# Patient Record
Sex: MALE | Race: WHITE | NOT HISPANIC OR LATINO | URBAN - METROPOLITAN AREA
[De-identification: names, ages, dates, MRNs, and addresses within clinical notes are randomized per-mention and may not be internally consistent; named-entity substitution may affect disease eponyms.]

---

## 2018-11-26 ENCOUNTER — EMERGENCY (EMERGENCY)
Facility: HOSPITAL | Age: 83
LOS: 1 days | Discharge: HOME | End: 2018-11-26
Attending: EMERGENCY MEDICINE | Admitting: EMERGENCY MEDICINE
Payer: MEDICARE

## 2018-11-26 VITALS
HEART RATE: 97 BPM | SYSTOLIC BLOOD PRESSURE: 151 MMHG | DIASTOLIC BLOOD PRESSURE: 87 MMHG | OXYGEN SATURATION: 94 % | TEMPERATURE: 98 F | RESPIRATION RATE: 18 BRPM

## 2018-11-26 VITALS
TEMPERATURE: 98 F | RESPIRATION RATE: 16 BRPM | SYSTOLIC BLOOD PRESSURE: 140 MMHG | OXYGEN SATURATION: 95 % | HEART RATE: 89 BPM | DIASTOLIC BLOOD PRESSURE: 80 MMHG

## 2018-11-26 DIAGNOSIS — S01.511A LACERATION WITHOUT FOREIGN BODY OF LIP, INITIAL ENCOUNTER: ICD-10-CM

## 2018-11-26 DIAGNOSIS — S01.111A LACERATION WITHOUT FOREIGN BODY OF RIGHT EYELID AND PERIOCULAR AREA, INITIAL ENCOUNTER: ICD-10-CM
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
## 2018-11-26 DIAGNOSIS — S60.511A ABRASION OF RIGHT HAND, INITIAL ENCOUNTER: ICD-10-CM

## 2018-11-26 DIAGNOSIS — S09.93XA UNSPECIFIED INJURY OF FACE, INITIAL ENCOUNTER: ICD-10-CM
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
## 2018-11-26 DIAGNOSIS — S00.81XA ABRASION OF OTHER PART OF HEAD, INITIAL ENCOUNTER: ICD-10-CM

## 2018-11-26 PROCEDURE — 70450 CT HEAD/BRAIN W/O DYE: CPT

## 2018-11-26 PROCEDURE — 70486 CT MAXILLOFACIAL W/O DYE: CPT | Mod: 26

## 2018-11-26 PROCEDURE — 99284 EMERGENCY DEPT VISIT MOD MDM: CPT

## 2018-11-26 PROCEDURE — 70450 CT HEAD/BRAIN W/O DYE: CPT | Mod: 26

## 2018-11-26 PROCEDURE — 13151 CMPLX RPR E/N/E/L 1.1-2.5 CM: CPT

## 2018-11-26 PROCEDURE — 12011 RPR F/E/E/N/L/M 2.5 CM/<: CPT

## 2018-11-26 PROCEDURE — 99285 EMERGENCY DEPT VISIT HI MDM: CPT | Mod: 25

## 2018-11-26 PROCEDURE — 70486 CT MAXILLOFACIAL W/O DYE: CPT

## 2018-11-26 RX ORDER — BACITRACIN ZINC 500 UNIT/G
1 OINTMENT IN PACKET (EA) TOPICAL ONCE
Qty: 0 | Refills: 0 | Status: COMPLETED | OUTPATIENT
Start: 2018-11-26 | End: 2018-11-26

## 2018-11-26 RX ORDER — ACETAMINOPHEN 500 MG
975 TABLET ORAL ONCE
Qty: 0 | Refills: 0 | Status: DISCONTINUED | OUTPATIENT
Start: 2018-11-26 | End: 2018-11-30

## 2018-11-26 RX ADMIN — Medication 1 APPLICATION(S): at 20:40

## 2018-11-26 NOTE — ED PROVIDER NOTE - CARE PLAN
Principal Discharge DX:	Facial trauma, initial encounter  Secondary Diagnosis:	Lip laceration, initial encounter  Secondary Diagnosis:	Fall from standing, initial encounter

## 2018-11-26 NOTE — ED ADULT NURSE NOTE - OBJECTIVE STATEMENT
84 y/o male w/ hx of HLD, on aspirin, reports to ED s/p mechanical trip and fall PTA hitting face/nose/lip/forehead. Denies LOC. No deformity noted. Laceration noted to upper lip, below right eyebrow and abrasions noted to nose and right hand. Pt ambulatory s/p fall. Denies any other sx. Tetanus UTD.

## 2018-11-26 NOTE — ED PROVIDER NOTE - MEDICAL DECISION MAKING DETAILS
Wounds cleansed and closed by Plastics Dr. Rodriguez. No ICH, will f/u with Dr. Rodriguez outpt. Sterile dressing applied. Given return precautions.

## 2018-11-26 NOTE — CONSULT NOTE ADULT - ASSESSMENT
laceration of right upper eyelid 2cm  crush laceration mid upper lip   minimal nasal fracture  facial abrasions  abrasion right hand dorsum

## 2018-11-26 NOTE — CONSULT NOTE ADULT - ATTENDING COMMENTS
I took a history, examined the patient, reviewed his CT scan, discussed treatment options with patient and ER staff, repaired the lip and eyelid lacerations and then discussed aftercare and follow up.

## 2018-11-26 NOTE — ED PROVIDER NOTE - OBJECTIVE STATEMENT
82 y/o M w/hx HLD, takes baby ASA, walks unassisted, active fully functional individual, tripped and fell forward on the street today in the heavy rain, hitting his face/nose/lip/forehead. States he also scratched the back of his R hand but denies hand pain, wrist pain, or pain of any kind other than his face. No LOC, remembers the entire event. Ambulatory after fall. Last tetanus 2 yrs ago.

## 2018-11-26 NOTE — CONSULT NOTE ADULT - SUBJECTIVE AND OBJECTIVE BOX
Mechanical slip and fall.   CT negative for intra-cranial bleed, nondisplaced nasal fracture, old maxillary sinus thickening bilaterally without fluid levels.   Sustained abrasions of nose, 2cm laceration of right upper eyelid, crushed "W-shaped" wound of mid upper lip  dentition intact.  c- spine non-tender  abrasion right hand dorsum

## 2018-11-26 NOTE — ED ADULT TRIAGE NOTE - OTHER COMPLAINTS
Patient denies any dizziness or LOC. Laceration noted to upper lip, below right eyebrow and abrasions noted to nose and right hand. Patient reports he is on Aspirin.

## 2019-07-12 ENCOUNTER — EMERGENCY (EMERGENCY)
Facility: HOSPITAL | Age: 84
LOS: 1 days | Discharge: HOME | End: 2019-07-12
Attending: EMERGENCY MEDICINE | Admitting: EMERGENCY MEDICINE
Payer: MEDICARE

## 2019-07-12 VITALS
RESPIRATION RATE: 18 BRPM | TEMPERATURE: 98 F | OXYGEN SATURATION: 100 % | DIASTOLIC BLOOD PRESSURE: 67 MMHG | SYSTOLIC BLOOD PRESSURE: 107 MMHG | HEART RATE: 66 BPM

## 2019-07-12 VITALS
HEART RATE: 78 BPM | SYSTOLIC BLOOD PRESSURE: 100 MMHG | RESPIRATION RATE: 18 BRPM | DIASTOLIC BLOOD PRESSURE: 55 MMHG | TEMPERATURE: 98 F | WEIGHT: 190.04 LBS | OXYGEN SATURATION: 100 %

## 2019-07-12 DIAGNOSIS — R07.89 OTHER CHEST PAIN: ICD-10-CM

## 2019-07-12 LAB
ALBUMIN SERPL ELPH-MCNC: 3.9 G/DL — SIGNIFICANT CHANGE UP (ref 3.3–5)
ALP SERPL-CCNC: 47 U/L — SIGNIFICANT CHANGE UP (ref 40–120)
ALT FLD-CCNC: 15 U/L — SIGNIFICANT CHANGE UP (ref 10–45)
ANION GAP SERPL CALC-SCNC: 10 MMOL/L — SIGNIFICANT CHANGE UP (ref 5–17)
APTT BLD: 28.2 SEC — SIGNIFICANT CHANGE UP (ref 27.5–36.3)
AST SERPL-CCNC: 17 U/L — SIGNIFICANT CHANGE UP (ref 10–40)
BASOPHILS # BLD AUTO: 0.05 K/UL — SIGNIFICANT CHANGE UP (ref 0–0.2)
BASOPHILS NFR BLD AUTO: 0.5 % — SIGNIFICANT CHANGE UP (ref 0–2)
BILIRUB SERPL-MCNC: 0.8 MG/DL — SIGNIFICANT CHANGE UP (ref 0.2–1.2)
BUN SERPL-MCNC: 16 MG/DL — SIGNIFICANT CHANGE UP (ref 7–23)
CALCIUM SERPL-MCNC: 8.8 MG/DL — SIGNIFICANT CHANGE UP (ref 8.4–10.5)
CHLORIDE SERPL-SCNC: 104 MMOL/L — SIGNIFICANT CHANGE UP (ref 96–108)
CO2 SERPL-SCNC: 25 MMOL/L — SIGNIFICANT CHANGE UP (ref 22–31)
CREAT SERPL-MCNC: 1.1 MG/DL — SIGNIFICANT CHANGE UP (ref 0.5–1.3)
EOSINOPHIL # BLD AUTO: 0.2 K/UL — SIGNIFICANT CHANGE UP (ref 0–0.5)
EOSINOPHIL NFR BLD AUTO: 2.1 % — SIGNIFICANT CHANGE UP (ref 0–6)
GLUCOSE SERPL-MCNC: 111 MG/DL — HIGH (ref 70–99)
HCT VFR BLD CALC: 44.9 % — SIGNIFICANT CHANGE UP (ref 39–50)
HGB BLD-MCNC: 14.6 G/DL — SIGNIFICANT CHANGE UP (ref 13–17)
IMM GRANULOCYTES NFR BLD AUTO: 0.3 % — SIGNIFICANT CHANGE UP (ref 0–1.5)
INR BLD: 1.05 — SIGNIFICANT CHANGE UP (ref 0.88–1.16)
LYMPHOCYTES # BLD AUTO: 1.15 K/UL — SIGNIFICANT CHANGE UP (ref 1–3.3)
LYMPHOCYTES # BLD AUTO: 12.2 % — LOW (ref 13–44)
MCHC RBC-ENTMCNC: 31.8 PG — SIGNIFICANT CHANGE UP (ref 27–34)
MCHC RBC-ENTMCNC: 32.5 GM/DL — SIGNIFICANT CHANGE UP (ref 32–36)
MCV RBC AUTO: 97.8 FL — SIGNIFICANT CHANGE UP (ref 80–100)
MONOCYTES # BLD AUTO: 0.75 K/UL — SIGNIFICANT CHANGE UP (ref 0–0.9)
MONOCYTES NFR BLD AUTO: 8 % — SIGNIFICANT CHANGE UP (ref 2–14)
NEUTROPHILS # BLD AUTO: 7.22 K/UL — SIGNIFICANT CHANGE UP (ref 1.8–7.4)
NEUTROPHILS NFR BLD AUTO: 76.9 % — SIGNIFICANT CHANGE UP (ref 43–77)
NRBC # BLD: 0 /100 WBCS — SIGNIFICANT CHANGE UP (ref 0–0)
PLATELET # BLD AUTO: 211 K/UL — SIGNIFICANT CHANGE UP (ref 150–400)
POTASSIUM SERPL-MCNC: 4.1 MMOL/L — SIGNIFICANT CHANGE UP (ref 3.5–5.3)
POTASSIUM SERPL-SCNC: 4.1 MMOL/L — SIGNIFICANT CHANGE UP (ref 3.5–5.3)
PROT SERPL-MCNC: 6.8 G/DL — SIGNIFICANT CHANGE UP (ref 6–8.3)
PROTHROM AB SERPL-ACNC: 11.9 SEC — SIGNIFICANT CHANGE UP (ref 10–12.9)
RBC # BLD: 4.59 M/UL — SIGNIFICANT CHANGE UP (ref 4.2–5.8)
RBC # FLD: 12.1 % — SIGNIFICANT CHANGE UP (ref 10.3–14.5)
SODIUM SERPL-SCNC: 139 MMOL/L — SIGNIFICANT CHANGE UP (ref 135–145)
TROPONIN T SERPL-MCNC: <0.01 NG/ML — SIGNIFICANT CHANGE UP (ref 0–0.01)
WBC # BLD: 9.4 K/UL — SIGNIFICANT CHANGE UP (ref 3.8–10.5)
WBC # FLD AUTO: 9.4 K/UL — SIGNIFICANT CHANGE UP (ref 3.8–10.5)

## 2019-07-12 PROCEDURE — 80053 COMPREHEN METABOLIC PANEL: CPT

## 2019-07-12 PROCEDURE — 99285 EMERGENCY DEPT VISIT HI MDM: CPT | Mod: 25

## 2019-07-12 PROCEDURE — 99283 EMERGENCY DEPT VISIT LOW MDM: CPT | Mod: 25

## 2019-07-12 PROCEDURE — 84484 ASSAY OF TROPONIN QUANT: CPT

## 2019-07-12 PROCEDURE — 71045 X-RAY EXAM CHEST 1 VIEW: CPT

## 2019-07-12 PROCEDURE — 85025 COMPLETE CBC W/AUTO DIFF WBC: CPT

## 2019-07-12 PROCEDURE — 71045 X-RAY EXAM CHEST 1 VIEW: CPT | Mod: 26

## 2019-07-12 PROCEDURE — 85610 PROTHROMBIN TIME: CPT

## 2019-07-12 PROCEDURE — 85730 THROMBOPLASTIN TIME PARTIAL: CPT

## 2019-07-12 NOTE — ED ADULT NURSE NOTE - OBJECTIVE STATEMENT
Pt presents complaining of chest tightness that started at 8:30 am. Pt denies any fevers, no lightheadedness, no dizziness, no headache, no cp, no sob, no n/v, no changes to bowels, no urinary com[plaints.

## 2019-07-12 NOTE — ED PROVIDER NOTE - ATTENDING CONTRIBUTION TO CARE
Pt is an 85yo m, no pmh, who p/w mid sternal cp starting 4a today. No associated sob, palp, dizziness, weakness, syncope... Sx's non-exertional, non-radiating.     VITAL SIGNS: I have reviewed nursing notes and confirm.  CONSTITUTIONAL: Well-developed; well-nourished; in no acute distress.   SKIN:  warm and dry, no acute rash.   HEAD:  normocephalic, atraumatic.  EYES: PERRL, EOM intact; conjunctiva and sclera clear.  ENT: No nasal discharge; airway clear.   NECK: Supple; non tender.  CARD: S1, S2 normal; no murmurs, gallops, or rubs. Regular rate and rhythm.   RESP:  Clear to auscultation b/l, no wheezes, rales or rhonchi.  ABD: Normal bowel sounds; soft; non-distended; non-tender; no guarding/ rebound.  EXT: Normal ROM. No clubbing, cyanosis or edema. 2+ pulses to b/l ue/le.  NEURO: Alert, oriented, grossly unremarkable  PSYCH: Cooperative, mood and affect appropriate.    EKG showing nsr, rbbb, q's inferior leads. No prior to compare to. Trop neg. CXR neg. Spoke w/ pt regarding need for further cardiac testing including cardiac cta/ trop, however pt refusing and seeking dc home. Patient desires to leave emergency department against medical advice, prior to completion of my desired evaluation and treatment plan.  Patient's mental status is normal, patient is fully alert and oriented x person, place and time.  Patient demonstrates clear reasoning capabilities and capacity to make this decision.  Patient fully understands the explained risks involved with this decision including worsening of medical condition, missed and or delayed diagnosis, permanent disability and death.  All alternative options given to patient who still desires discharge against medical advice from ED and will follow up as an outpatient.  Patient given the option to return to ED at any time to have further evaluation and treatment. Pt is an 85yo m, no pmh, who p/w mid sternal cp starting 4a today. No associated sob, palp, dizziness, sweating, weakness, syncope... Sx's non-exertional, non-radiating. Currently asymptomatic. Had normal stress test and echo few months ago.    VITAL SIGNS: I have reviewed nursing notes and confirm.  CONSTITUTIONAL: Well-developed; well-nourished; in no acute distress.   SKIN:  warm and dry, no acute rash.   HEAD:  normocephalic, atraumatic.  EYES: EOM intact; conjunctiva and sclera clear.  ENT: No nasal discharge; airway clear.   NECK: Supple; non tender.  CARD: S1, S2 normal; no murmurs, gallops, or rubs. Regular rate and rhythm.   RESP:  Clear to auscultation b/l, no wheezes, rales or rhonchi.  ABD: Normal bowel sounds; soft; non-distended; non-tender; no guarding/ rebound.  EXT: Normal ROM. No clubbing, cyanosis or edema. 2+ pulses to b/l ue/le.  NEURO: Alert, oriented, grossly unremarkable  PSYCH: Cooperative, mood and affect appropriate.    EKG showing nsr, rbbb, q's inferior leads. Pt stating he has h/o rbbb which prompted stress test few months ago. No prior ekg to compare to. Trop neg. CXR neg. Spoke w/ pt regarding need for further cardiac testing including cardiac cta/ trop, however pt refusing and seeking dc home. Patient desires to leave emergency department against medical advice, prior to completion of my desired evaluation and treatment plan.  Patient's mental status is normal, patient is fully alert and oriented x person, place and time.  Patient demonstrates clear reasoning capabilities and capacity to make this decision.  Patient fully understands the explained risks involved with this decision including worsening of medical condition, missed and or delayed diagnosis, permanent disability and death.  All alternative options given to patient who still desires discharge against medical advice from ED and will follow up as an outpatient.  Patient given the option to return to ED at any time to have further evaluation and treatment.

## 2019-07-12 NOTE — ED PROVIDER NOTE - REFUSAL OF SERVICE, MDM
pt refused cta coronaries - stating he does not want to wait, also refused 2nd trop (offered as alternative option for adequate r/o) - wants to leave AMA, called his card from ed and will see him upon dc, pt understands that can not definitively r/o acs w/single trop, understands risks of leaving prior to complete w/u including worsening symptoms or death

## 2019-07-12 NOTE — ED ADULT NURSE NOTE - CHPI ED NUR SYMPTOMS NEG
no back pain/no shortness of breath/no chills/no chest pain/no congestion/no vomiting/no nausea/no syncope/no dizziness/no diaphoresis/no fever

## 2019-07-12 NOTE — ED PROVIDER NOTE - CLINICAL SUMMARY MEDICAL DECISION MAKING FREE TEXT BOX
pt c/o midsternal chest "tightness" since this am, not exertional, no associated symptoms, states no known cad and has seen cards a few mon ago for routine eval at which time had neg stress test and normal echo, ekg w/rbbb, cxr clear, trop x 1 neg, had long discussion w/pt about getting cta coronaries to r/o acs but declined, offered 2nd trop for sufficient r/o but also refused, pt spoke to his cards (non [facility]) and will see him upon dc from ed, understands that is signing out ama since w/u incomplete, pt w/full decision making capacity and risks of death understood

## 2019-07-12 NOTE — ED PROVIDER NOTE - OBJECTIVE STATEMENT
The pt is a 85 y/o M, BIBA, c/o "chest tightness" since this am. Pt states "I woke up with it", pain is 4/10, constant, dull, midsternal, non radiating, no aggravating or alleviating factors, took asa w/o change in pain. Had seen a cardiologist for routine eval a few mon ago - had normal stress test and echo. Denies sob, dyspnea, cough, n/v/d, abd pain, dizziness, diaphoresis, syncope, fevers, chills

## 2019-07-12 NOTE — ED PROVIDER NOTE - MUSCULOSKELETAL, MLM
Spine appears normal, range of motion is not limited, no muscle or joint tenderness; no LE edema or tend b/l

## 2020-11-11 ENCOUNTER — EMERGENCY (EMERGENCY)
Facility: HOSPITAL | Age: 85
LOS: 1 days | Discharge: HOME | End: 2020-11-11
Attending: EMERGENCY MEDICINE | Admitting: EMERGENCY MEDICINE
Payer: MEDICARE

## 2020-11-11 VITALS
RESPIRATION RATE: 16 BRPM | HEART RATE: 90 BPM | SYSTOLIC BLOOD PRESSURE: 150 MMHG | OXYGEN SATURATION: 95 % | DIASTOLIC BLOOD PRESSURE: 80 MMHG | TEMPERATURE: 98 F

## 2020-11-11 VITALS
OXYGEN SATURATION: 94 % | WEIGHT: 167.99 LBS | TEMPERATURE: 98 F | HEIGHT: 68 IN | HEART RATE: 98 BPM | DIASTOLIC BLOOD PRESSURE: 74 MMHG | RESPIRATION RATE: 18 BRPM | SYSTOLIC BLOOD PRESSURE: 122 MMHG
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
## 2020-11-11 DIAGNOSIS — S01.111A LACERATION WITHOUT FOREIGN BODY OF RIGHT EYELID AND PERIOCULAR AREA, INITIAL ENCOUNTER: ICD-10-CM

## 2020-11-11 DIAGNOSIS — Z20.828 CONTACT WITH AND (SUSPECTED) EXPOSURE TO OTHER VIRAL COMMUNICABLE DISEASES: ICD-10-CM

## 2020-11-11 DIAGNOSIS — S09.90XA UNSPECIFIED INJURY OF HEAD, INITIAL ENCOUNTER: ICD-10-CM

## 2020-11-11 DIAGNOSIS — S01.81XA LACERATION WITHOUT FOREIGN BODY OF OTHER PART OF HEAD, INITIAL ENCOUNTER: ICD-10-CM

## 2020-11-11 DIAGNOSIS — S02.2XXA FRACTURE OF NASAL BONES, INITIAL ENCOUNTER FOR CLOSED FRACTURE: ICD-10-CM
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
## 2020-11-11 DIAGNOSIS — S01.511A LACERATION WITHOUT FOREIGN BODY OF LIP, INITIAL ENCOUNTER: ICD-10-CM

## 2020-11-11 LAB
ANION GAP SERPL CALC-SCNC: 10 MMOL/L — SIGNIFICANT CHANGE UP (ref 5–17)
BASOPHILS # BLD AUTO: 0.07 K/UL — SIGNIFICANT CHANGE UP (ref 0–0.2)
BASOPHILS NFR BLD AUTO: 0.7 % — SIGNIFICANT CHANGE UP (ref 0–2)
BUN SERPL-MCNC: 24 MG/DL — HIGH (ref 7–23)
CALCIUM SERPL-MCNC: 9.3 MG/DL — SIGNIFICANT CHANGE UP (ref 8.4–10.5)
CHLORIDE SERPL-SCNC: 110 MMOL/L — HIGH (ref 96–108)
CO2 SERPL-SCNC: 27 MMOL/L — SIGNIFICANT CHANGE UP (ref 22–31)
CREAT SERPL-MCNC: 1.31 MG/DL — HIGH (ref 0.5–1.3)
EOSINOPHIL # BLD AUTO: 0.13 K/UL — SIGNIFICANT CHANGE UP (ref 0–0.5)
EOSINOPHIL NFR BLD AUTO: 1.3 % — SIGNIFICANT CHANGE UP (ref 0–6)
GLUCOSE SERPL-MCNC: 112 MG/DL — HIGH (ref 70–99)
HCT VFR BLD CALC: 43.9 % — SIGNIFICANT CHANGE UP (ref 39–50)
HGB BLD-MCNC: 14.6 G/DL — SIGNIFICANT CHANGE UP (ref 13–17)
IMM GRANULOCYTES NFR BLD AUTO: 0.3 % — SIGNIFICANT CHANGE UP (ref 0–1.5)
LYMPHOCYTES # BLD AUTO: 1.34 K/UL — SIGNIFICANT CHANGE UP (ref 1–3.3)
LYMPHOCYTES # BLD AUTO: 13 % — SIGNIFICANT CHANGE UP (ref 13–44)
MAGNESIUM SERPL-MCNC: 2.1 MG/DL — SIGNIFICANT CHANGE UP (ref 1.6–2.6)
MCHC RBC-ENTMCNC: 32.4 PG — SIGNIFICANT CHANGE UP (ref 27–34)
MCHC RBC-ENTMCNC: 33.3 GM/DL — SIGNIFICANT CHANGE UP (ref 32–36)
MCV RBC AUTO: 97.3 FL — SIGNIFICANT CHANGE UP (ref 80–100)
MONOCYTES # BLD AUTO: 0.66 K/UL — SIGNIFICANT CHANGE UP (ref 0–0.9)
MONOCYTES NFR BLD AUTO: 6.4 % — SIGNIFICANT CHANGE UP (ref 2–14)
NEUTROPHILS # BLD AUTO: 8.09 K/UL — HIGH (ref 1.8–7.4)
NEUTROPHILS NFR BLD AUTO: 78.3 % — HIGH (ref 43–77)
NRBC # BLD: 0 /100 WBCS — SIGNIFICANT CHANGE UP (ref 0–0)
PLATELET # BLD AUTO: 178 K/UL — SIGNIFICANT CHANGE UP (ref 150–400)
POTASSIUM SERPL-MCNC: 4.2 MMOL/L — SIGNIFICANT CHANGE UP (ref 3.5–5.3)
POTASSIUM SERPL-SCNC: 4.2 MMOL/L — SIGNIFICANT CHANGE UP (ref 3.5–5.3)
RBC # BLD: 4.51 M/UL — SIGNIFICANT CHANGE UP (ref 4.2–5.8)
RBC # FLD: 12.6 % — SIGNIFICANT CHANGE UP (ref 10.3–14.5)
SARS-COV-2 RNA SPEC QL NAA+PROBE: SIGNIFICANT CHANGE UP
SODIUM SERPL-SCNC: 147 MMOL/L — HIGH (ref 135–145)
TROPONIN T SERPL-MCNC: <0.01 NG/ML — SIGNIFICANT CHANGE UP (ref 0–0.01)
WBC # BLD: 10.32 K/UL — SIGNIFICANT CHANGE UP (ref 3.8–10.5)
WBC # FLD AUTO: 10.32 K/UL — SIGNIFICANT CHANGE UP (ref 3.8–10.5)

## 2020-11-11 PROCEDURE — 70486 CT MAXILLOFACIAL W/O DYE: CPT | Mod: 26

## 2020-11-11 PROCEDURE — 36415 COLL VENOUS BLD VENIPUNCTURE: CPT

## 2020-11-11 PROCEDURE — 73130 X-RAY EXAM OF HAND: CPT | Mod: 26,RT

## 2020-11-11 PROCEDURE — 99285 EMERGENCY DEPT VISIT HI MDM: CPT | Mod: 25

## 2020-11-11 PROCEDURE — 84484 ASSAY OF TROPONIN QUANT: CPT

## 2020-11-11 PROCEDURE — 13152 CMPLX RPR E/N/E/L 2.6-7.5 CM: CPT | Mod: XU

## 2020-11-11 PROCEDURE — U0003: CPT

## 2020-11-11 PROCEDURE — 85025 COMPLETE CBC W/AUTO DIFF WBC: CPT

## 2020-11-11 PROCEDURE — 70486 CT MAXILLOFACIAL W/O DYE: CPT

## 2020-11-11 PROCEDURE — 99285 EMERGENCY DEPT VISIT HI MDM: CPT | Mod: CS

## 2020-11-11 PROCEDURE — 70450 CT HEAD/BRAIN W/O DYE: CPT

## 2020-11-11 PROCEDURE — 80048 BASIC METABOLIC PNL TOTAL CA: CPT

## 2020-11-11 PROCEDURE — 82962 GLUCOSE BLOOD TEST: CPT

## 2020-11-11 PROCEDURE — 83735 ASSAY OF MAGNESIUM: CPT

## 2020-11-11 PROCEDURE — 40650 RPR LIP FTH VERMILION ONLY: CPT

## 2020-11-11 PROCEDURE — 70450 CT HEAD/BRAIN W/O DYE: CPT | Mod: 26

## 2020-11-11 PROCEDURE — 73130 X-RAY EXAM OF HAND: CPT

## 2020-11-11 RX ORDER — SODIUM CHLORIDE 9 MG/ML
1000 INJECTION INTRAMUSCULAR; INTRAVENOUS; SUBCUTANEOUS
Refills: 0 | Status: DISCONTINUED | OUTPATIENT
Start: 2020-11-11 | End: 2020-11-15

## 2020-11-11 RX ADMIN — SODIUM CHLORIDE 100 MILLILITER(S): 9 INJECTION INTRAMUSCULAR; INTRAVENOUS; SUBCUTANEOUS at 19:03

## 2020-11-11 NOTE — ED PROVIDER NOTE - PATIENT PORTAL LINK FT
You can access the FollowMyHealth Patient Portal offered by [facility] by registering at the following website: [email or web address]. By joining [employer]’s FollowMyHealth portal, you will also be able to view your health information using other applications (apps) compatible with our system.

## 2020-11-11 NOTE — ED PROVIDER NOTE - CLINICAL SUMMARY MEDICAL DECISION MAKING FREE TEXT BOX
Pt presents s/p fall s/p feeling b/l LE weakness w/o other complaints. No syncope or LOC. There are no EKG changes to suggest ischemia, infarction, or pericarditis. CT / XR to r/o injury. TDaP UTD. Laceration through vermillion border, gaping. Pt also requesting plastics. Dispo pending w/u, clinical status. Anticipate d/c if w/u negative

## 2020-11-11 NOTE — CONSULT NOTE ADULT - SUBJECTIVE AND OBJECTIVE BOX
85 year old male s/p trip and fall with no loss of consciousness, no aspirin or anticoagulation.  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  After patient stable, I was called to repair traumatic injury to right eyebrow and upper lip.  Patient had a fall sustained several years ago with through and through injury of the lip that was repaired, and physical exam is consistent with this as there is a well healed jagged scar just lateral to the current injury and extensive scar tissue within the substance of the lip just lateral to the current injury with some sclerotic changes of the orbicularis oris muscle within the injury site.    On exam there is a 4 cm laceration through the complete length of the right eyebrow.  This injury is through skin and muscle.  The lip injury as noted is through the mucosa of the wet and dry lip and the orbicularis muscle is disrupted.  The injury is through the vermilion border and into the right philtral column.

## 2020-11-11 NOTE — ED PROVIDER NOTE - PROGRESS NOTE DETAILS
Plastics consulted for lac repair: lac through vermillion border, through eyebrow, pt also requesting plastics on call Dr Fuentes in the ED evaluating the pt Wound repaired by plastics. Pt to f/u Dr Fuentes within 1 week. Pt has info from Dr Fuentes. Pt advised on nasal bone fx, and f/u w/ ortho for this. Pt advised to hydrate more. Pt reports nasal bone fx last year. Pt given opportunity to ask questions and demonstrates understanding of plan.

## 2020-11-11 NOTE — ED ADULT NURSE NOTE - CHPI ED NUR SYMPTOMS NEG
no loss of consciousness/no tingling/no confusion/no vomiting/no deformity/no fever/no weakness/no numbness

## 2020-11-11 NOTE — ED PROVIDER NOTE - DIAGNOSTIC INTERPRETATION
R hand INTERPRETATION:  no acute fracture; no soft tissue swelling noted; normal bony alignment. degenerative changes at the DIPs R 3th and 5th fingers

## 2020-11-11 NOTE — ED PROVIDER NOTE - OBJECTIVE STATEMENT
Pt w/ PMHx HLD presents s/p fall. Pt was walking, felt weak in b/l legs, and fell forward. Pt reports he fell onto R hand (pt is LHD), breaking the fall, and hitting his face. Pt also broke his glasses. Pt denies LOC. Denies preceding CP, SOB, palpitations, diaphoresis, lightheadedness, vertiginous sx, and stroke-like sx. TDaP within 10 years. Denies neck/ back / joint pain. No chest / abd injuries. Pt c/o lacerations to face, pain to the face, and pain to the R hand

## 2020-11-11 NOTE — ED ADULT TRIAGE NOTE - CHIEF COMPLAINT QUOTE
Pt presents to ED c/o laceration to lip and above right eyebrow s/p mechanical trip and fall. Denies LOC or blood thinners. Denies neck pain, back pain, and any other sx. Pt was able to get up after fall w/ help.

## 2020-11-11 NOTE — ED PROVIDER NOTE - CARE PROVIDER_API CALL
James Fuentes)  Plastic Surgery  [address], [address]  [place] [address]  Phone: [phone]  Fax: [phone]  Follow Up Time:

## 2020-11-11 NOTE — ED PROVIDER NOTE - NSFOLLOWUPINSTRUCTIONS_ED_ALL_ED_FT
Fall Prevention for Older Adults    WHAT YOU NEED TO KNOW:    As you age, your muscles weaken and your risk for falls increases. Your risk also increases if you take medicines that make you sleepy or dizzy. You may also be at risk if you have vision or joint problems, have low blood pressure, or are not active.    DISCHARGE INSTRUCTIONS:    Call 911 or have someone else call if:   •You have fallen and are unconscious.      •You have fallen and cannot move part of your body.      Contact your healthcare provider if:   •You have fallen and have pain or a headache.      •You have questions or concerns about your condition or care.      Fall prevention tips:   •Stay active. Exercise can help strengthen your muscles and improve your balance. Your healthcare provider may recommend water aerobics, walking, or Alfred[employer] Chi. He or she may also recommend physical therapy to improve your coordination. Never start an exercise program without asking your healthcare provider first.  Water Aerobics for Seniors       [employer] for Seniors           •Wear shoes that fit well and have soles that . Wear shoes both inside and outside. Use slippers with good . Avoid shoes with high heels.      •Use assistive devices as directed. Your healthcare provider may suggest that you use a cane or walker to help you keep your balance. You may need to have grab bars put in your bathroom near the toilet or in the shower.      •Stand or sit up slowly. This may help you keep your balance and prevent falls.      •Wear a personal alarm. This is a device that allows you to call 911 if you need help. Ask for more information on personal alarms.      •Manage your medical conditions.  Keep all appointments with your healthcare providers. Visit your eye doctor as directed.      Home safety tips:     Fall Prevention for Seniors     •Add items to prevent falls in the bathroom. Put nonslip strips on your bath or shower floor to prevent you from slipping. Use a bath mat if you do not have carpet in the bathroom. This will prevent you from falling when you step out of the bath or shower. Use a shower seat so you do not need to stand while you shower. Sit on the toilet or a chair in your bathroom to dry yourself and put on clothing. This will prevent you from losing your balance from drying or dressing yourself while you are standing.      •Keep paths clear. Remove books, shoes, and other objects from walkways and stairs. Place cords for telephones and lamps out of the way so that you do not need to walk over them. Tape them down if you cannot move them. Remove small rugs. If you cannot remove a rug, secure it with double-sided tape. This will prevent you from tripping.      •Install bright lights in your home. Use night lights to help light paths to the bathroom or kitchen. Always turn on the light before you start walking.      •Keep items you use often on shelves within reach. Do not use a step stool to help you reach an item.      •Paint or place reflective tape on the edges of your stairs. This will help you see the stairs better.      Follow up with your healthcare provider as directed: Write down your questions so you remember to ask them during your visits.         Facial Laceration      A facial laceration is a cut (laceration) on the face. You can get a facial laceration from any accident or injury that cuts or tears the skin or tissues on your face. Facial lacerations can bleed and be painful. You may need medical attention to stop the bleeding, help the wound heal, lower your risk for infection, and prevent scarring. Lacerations usually heal quickly after treatment.      What are the causes?  Facial lacerations are often caused by:  •A motor vehicle accident.      •A sports injury.      •A violent attack.      •A fall.        What are the signs or symptoms?  Common symptoms of this condition include:  •An obvious cut on the face.      •Bleeding.      •Pain.      •Swelling.      •Bruising.      •A change in the appearance of the face (deformity).        How is this diagnosed?    Your health care provider can diagnose a facial laceration by doing a physical exam and asking how the injury happened. Your provider will also check for areas of bleeding, tissue damage, nerve injury, and a foreign body in your wound.      How is this treated?  Treatment for a facial laceration depends on how severe and deep the wound is. It also depends on the risk for infection. First, your health care provider will clean the wound to prevent infection. Then, your health care provider will decide whether to close the wound. This depends on how deep the laceration is and how long ago your injury happened. If there is an increased risk of infection, the wound will not be closed.•If your wound needs to be closed:  •Your health care provider will use stitches (sutures), skin glue (skin adhesive), or skin adhesive strips to repair the laceration.      •Your health care provider may first numb the area around your wound by injecting a numbing medicine (local anesthetic) in and around your laceration before doing the sutures.      •Torn skin edges or dead skin may be removed.      •If sutures are used, the laceration may be closed in layers. Absorbable sutures will be used for deep tissues and muscle. Removable sutures will be used to close the skin.      •You may be given:  •Pain medicine.      •A tetanus shot.      •Oral antibiotic medicines.      •Antibiotic ointment.          Follow these instructions at home:    Wound care     Follow your health care provider’s instructions for wound care. These instructions will vary depending on how the wound was closed.  For sutures:   •Keep the wound clean and dry.      •If you were given a bandage (dressing), change it at least once a day, or as told by your health care provider. Also change the dressing if it gets wet or dirty.      •Wash the wound with soap and water two times a day, or as told by your health care provider. Rinse off the soap with water. Pat the wound dry with a clean towel.      •After cleaning, apply a thin layer of antibiotic ointment as told by your health care provider. This helps prevent infection and keeps the dressing from sticking to the wound.      •You may shower as usual after the first 24 hours. Do not soak the wound until the sutures are removed.      •Return to have you sutures removed as told by your health care provider.      • Do not wear makeup until your health care provider has approved.    For skin adhesive:    •You may briefly wet your wound in the shower or bath.      • Do not soak or scrub the wound.      • Do not swim.      • Do not sweat heavily until the skin adhesive has fallen off on its own.      •After showering or bathing, gently pat the wound dry with a clean towel.      • Do not apply liquid medicine, cream medicine, ointment, or makeup to your wound while the skin adhesive is in place. This may loosen the film before your wound is healed.      •If you have a dressing over your wound, be careful not to apply tape directly over the skin adhesive. This may pull off the adhesive before the wound is healed.      • Do not spend a long time in the sun or use a tanning lamp while the skin adhesive is in place.      •The skin adhesive will usually remain in place for 5–10 days and then naturally fall off the skin. Do not pick at the adhesive film.    For skin adhesive strips:    •Keep the wound clean and dry.      • Do not let the skin adhesive strips get wet.      •Bathe carefully to keep the wound and adhesive strips dry. If the wound gets wet, pat it dry with a clean towel right away.      •Skin adhesive strips fall off on their own over time. You may trim the strips as the wound heals. Do not remove skin adhesive strips that are still stuck to the wound.        General instructions    •Check your wound area every day for signs of infection. Check for:  •Redness, swelling, or pain.      •Fluid or blood.      •Warmth.      •Pus or a bad smell.        •Take over-the-counter and prescription medicines only as told by your health care provider.      •If you were prescribed an antibiotic, take or apply it as told by your health care provider. Do not stop using the antibiotic even if your condition improves.    •After the laceration has healed:  •Know that it can take a year or two for redness or scarring to fade.      •Apply sunscreen to the skin of your healed wound to minimize scarring. Ultraviolet (UV) rays can darken scar tissue.          Contact a health care provider if:    •You have a fever.      •You have redness, swelling, or pain around your wound.      •You have fluid or blood coming from your wound.      •Your wound feels warm to the touch.      •You have pus or a bad smell coming from your wound.        Get help right away if:    •You have a red streak going away from your wound.        Summary    •You may need treatment for a facial laceration to prevent infection, stop bleeding, help healing, and prevent scarring.      •A deep laceration may be closed with stitches (sutures).      •Follow your health care provider's wound care instructions carefully.      This information is not intended to replace advice given to you by your health care provider. Make sure you discuss any questions you have with your health care provider.      Laceration Care, Adult      A laceration is a cut that may go through all layers of the skin and into the tissue that is right under the skin. Some lacerations heal on their own. Others need to be closed with stitches (sutures), staples, skin adhesive strips, or skin glue. Proper care of a laceration reduces the risk for infection, helps the laceration heal better, and may prevent scarring.      How to care for your laceration    Wash your hands with soap and water before touching your wound or changing your bandage (dressing). If soap and water are not available, use hand .    Keep the wound clean and dry.    If you were given a dressing, you should change it at least once a day, or as told by your health care provider. You should also change it if it becomes wet or dirty.    If sutures or staples were used:     •Keep the wound completely dry for the first 24 hours, or as told by your health care provider. After that time, you may shower or bathe. However, make sure that the wound is not soaked in water until after the sutures or staples have been removed.    •Clean the wound once each day, or as told by your health care provider:  •Wash the wound with soap and water.      •Rinse the wound with water to remove all soap.      •Pat the wound dry with a clean towel. Do not rub the wound.        •After cleaning the wound, apply a thin layer of antibiotic ointment as told by your health care provider. This will help prevent infection and keep the dressing from sticking to the wound.      •Have the sutures or staples removed as told by your health care provider.      If skin adhesive strips were used:     • Do not get the skin adhesive strips wet. You may shower or bathe, but be careful to keep the wound dry.      •If the wound gets wet, pat it dry with a clean towel. Do not rub the wound.      •Skin adhesive strips fall off on their own. You may trim the strips as the wound heals. Do not remove skin adhesive strips that are still stuck to the wound. They will fall off in time.      If skin glue was used:     •Try to keep the wound dry, but you may briefly wet it in the shower or bath. Do not soak the wound in water, such as by swimming.      •After you have showered or bathed, gently pat the wound dry with a clean towel. Do not rub the wound.      • Do not do any activities that will make you sweat heavily until the skin glue has fallen off on its own.      • Do not apply liquid, cream, or ointment medicine to the wound while the skin glue is in place. Using those may loosen the film before the wound has healed.      •If a dressing is placed over the wound, be careful not to apply tape directly over the skin glue. Doing that may cause the glue to be pulled off before the wound has healed.      • Do not pick at the glue. Skin glue usually remains in place for 5–10 days and then falls off the skin.        General instructions      •Take over-the-counter and prescription medicines only as told by your health care provider.      •If you were prescribed an antibiotic medicine or ointment, take or apply it as told by your health care provider. Do not stop using it even if your condition improves.      • Do not scratch or pick at the wound.    •Check your wound every day for signs of infection. Watch for:  •Redness, swelling, or pain.      •Fluid, blood, or pus.        •Raise (elevate) the injured area above the level of your heart while you are sitting or lying down for the first 24–48 hours after the laceration is repaired.    •If directed, put ice on the affected area:  •Put ice in a plastic bag.      •Place a towel between your skin and the bag.      •Leave the ice on for 20 minutes, 2–3 times a day.        •Keep all follow-up visits as told by your health care provider. This is important.        Contact a health care provider if:    •You received a tetanus shot and you have swelling, severe pain, redness, or bleeding at the injection site.      •You have a fever.      •A wound that was closed breaks open.      •You notice a bad smell coming from your wound or your dressing.      •You notice something coming out of the wound, such as wood or glass.      •Your pain is not controlled with medicine.      •You have increased redness, swelling, or pain at the site of your wound.      •You have fluid, blood, or pus coming from your wound.      •You need to change the dressing often due to fluid, blood, or pus that is draining from the wound.      •You develop a new rash.      •You develop numbness around the wound.        Get help right away if:    •You develop severe swelling around the wound.      •Your pain suddenly increases and is severe.      •You develop painful lumps near the wound or on skin anywhere else on your body.      •You have a red streak going away from your wound.      •The wound is on your hand or foot and you cannot properly move a finger or toe.      •The wound is on your hand or foot, and you notice that your fingers or toes look pale or bluish.        Summary    •A laceration is a cut that may go through all layers of the skin and into the tissue that is right under the skin.      •Some lacerations heal on their own. Others need to be closed with stitches (sutures), staples, skin adhesive strips, or skin glue.      •Proper care of a laceration reduces the risk of infection, helps the laceration heal better, and prevents scarring.      This information is not intended to replace advice given to you by your health care provider. Make sure you discuss any questions you have with your health care provider.      Mouth Laceration      A mouth laceration is a deep cut in the lining of your mouth (mucosa). The laceration may extend into your lip or go through your mouth and cheek. Lacerations inside your mouth may involve your tongue, the insides of your cheeks, or the upper surface of your mouth (palate).    Mouth lacerations may bleed a lot because your mouth has a very rich supply of blood. Mouth lacerations may need to be repaired with stitches (sutures).      What are the causes?    This condition is most often caused by your teeth and by any injury that affects your face. The teeth may cut the lining of your mouth.      What are the signs or symptoms?  The most common symptom of a mouth laceration is bleeding. Other symptoms include:  •Pain.      •Feeling a hole or tear in your mouth.        How is this diagnosed?  This condition may be diagnosed with a physical exam of your mouth. Your health care provider may:  •Wash your mouth (irrigate) with a saline solution.       •Remove any blood clots to determine how bad your injury is.       •Take X-rays to rule out other injuries, such as injuries to the teeth, face, or jaw.        How is this treated?  Treatment depends on the location and severity of your injury. Small mouth lacerations may not need treatment if bleeding has stopped. You may need sutures if:  •You have a tongue laceration.      •Your mouth laceration is large or deep, or it continues to bleed.      If sutures are necessary, your health care provider may use absorbable sutures that dissolve as your body heals. You may also receive antibiotic medicine or a tetanus shot.      Follow these instructions at home:     Medicines     •Take over-the-counter and prescription medicines only as told by your health care provider.      •If you were prescribed an antibiotic medicine, take or apply it as told by your health care provider. Do not stop using the antibiotic even if you start to feel better.      • Do not drive or use heavy machinery while taking prescription pain medicine.      Wound care   •Check your wound every day for signs of infection. It is normal to have a white or gray patch over your wound while it heals. Watch for:  •Redness.      •Swelling.      •Blood or pus.        •Gently gargle and rinse your mouth 4–6 times a day. Spit out the liquid. Do not swallow.    •Use the rinse solution recommended by your health care provider, which may include:  •Antibacterial rinse (chlorhexidine).      •Saline rinse.        • Do not poke the sutures with your tongue. Doing that can loosen them.    •If you have a lip laceration:  •Keep the wound completely dry for the first 24 hours, or as told by your health care provider. After that time, you may shower or bathe. Do not soak in water until after the sutures have been removed.      •If you were given a bandage (dressing), you should change it at least once a day, or as told by your health care provider. You should also change it if it becomes wet or dirty.      •Clean the wound once each day, or as told by your health care provider.      •After cleaning the wound, apply a thin layer of antibiotic ointment as told by your health care provider. This can help prevent infection and keep the dressing from sticking to the wound.        General instructions     •Eat a soft diet, and avoid hot foods or liquids for a few days.      •Rinse your mouth after eating.      •Maintain regular oral hygiene. Gently brush your teeth with a soft, nylon-bristled toothbrush 2 times per day.      • Do not use any products that contain nicotine or tobacco, such as cigarettes and e-cigarettes. These can delay healing after injury. If you need help quitting, ask your health care provider.      •Keep all follow-up visits as told by your health care provider. This is important.        Contact a health care provider if:    •Your pain is not controlled with medicine.    •You have:  •A fever.      •Redness, swelling, or pain on your wound, and it is getting worse.      •Fresh bleeding or pus coming from your wound.      •Swollen or tender glands in your throat.          Get help right away if:    •The edges of your wound break open.      •Your face or the area under your jaw becomes swollen.      •You have trouble breathing or swallowing.        Summary    •A mouth laceration is a deep cut in the lining of your mouth.      •Mouth lacerations may bleed a lot because your mouth has a very rich supply of blood.      •While healing from a mouth laceration, check your wound every day for signs of infection.       •Contact a health care provider if you experience fresh bleeding or you notice that pus is coming out of your wound.      This information is not intended to replace advice given to you by your health care provider. Make sure you discuss any questions you have with your health care provider. 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 This should heal on its own. You may follow up with plastics for both this, and for the lacerations he repaired, in 1 week. The remainder of your imaging (CT brain, CT facial bones, and R hand xray) showed no acute findings. Your blood work showed some dehydration. Drink plenty of water.     Fall Prevention for Older Adults    WHAT YOU NEED TO KNOW:    As you age, your muscles weaken and your risk for falls increases. Your risk also increases if you take medicines that make you sleepy or dizzy. You may also be at risk if you have vision or joint problems, have low blood pressure, or are not active.    DISCHARGE INSTRUCTIONS:    Call 911 or have someone else call if:   •You have fallen and are unconscious.      •You have fallen and cannot move part of your body.      Contact your healthcare provider if:   •You have fallen and have pain or a headache.      •You have questions or concerns about your condition or care.      Fall prevention tips:   •Stay active. Exercise can help strengthen your muscles and improve your balance. Your healthcare provider may recommend water aerobics, walking, or [employer] Chi. He or she may also recommend physical therapy to improve your coordination. Never start an exercise program without asking your healthcare provider first.  Water Aerobics for Seniors       [employer] for Seniors           •Wear shoes that fit well and have soles that . Wear shoes both inside and outside. Use slippers with good . Avoid shoes with high heels.      •Use assistive devices as directed. Your healthcare provider may suggest that you use a cane or walker to help you keep your balance. You may need to have grab bars put in your bathroom near the toilet or in the shower.      •Stand or sit up slowly. This may help you keep your balance and prevent falls.      •Wear a personal alarm. This is a device that allows you to call 911 if you need help. Ask for more information on personal alarms.      •Manage your medical conditions.  Keep all appointments with your healthcare providers. Visit your eye doctor as directed.      Home safety tips:     Fall Prevention for Seniors     •Add items to prevent falls in the bathroom. Put nonslip strips on your bath or shower floor to prevent you from slipping. Use a bath mat if you do not have carpet in the bathroom. This will prevent you from falling when you step out of the bath or shower. Use a shower seat so you do not need to stand while you shower. Sit on the toilet or a chair in your bathroom to dry yourself and put on clothing. This will prevent you from losing your balance from drying or dressing yourself while you are standing.      •Keep paths clear. Remove books, shoes, and other objects from walkways and stairs. Place cords for telephones and lamps out of the way so that you do not need to walk over them. Tape them down if you cannot move them. Remove small rugs. If you cannot remove a rug, secure it with double-sided tape. This will prevent you from tripping.      •Install bright lights in your home. Use night lights to help light paths to the bathroom or kitchen. Always turn on the light before you start walking.      •Keep items you use often on shelves within reach. Do not use a step stool to help you reach an item.      •Paint or place reflective tape on the edges of your stairs. This will help you see the stairs better.      Follow up with your healthcare provider as directed: Write down your questions so you remember to ask them during your visits.         Facial Laceration      A facial laceration is a cut (laceration) on the face. You can get a facial laceration from any accident or injury that cuts or tears the skin or tissues on your face. Facial lacerations can bleed and be painful. You may need medical attention to stop the bleeding, help the wound heal, lower your risk for infection, and prevent scarring. Lacerations usually heal quickly after treatment.      What are the causes?  Facial lacerations are often caused by:  •A motor vehicle accident.      •A sports injury.      •A violent attack.      •A fall.        What are the signs or symptoms?  Common symptoms of this condition include:  •An obvious cut on the face.      •Bleeding.      •Pain.      •Swelling.      •Bruising.      •A change in the appearance of the face (deformity).        How is this diagnosed?    Your health care provider can diagnose a facial laceration by doing a physical exam and asking how the injury happened. Your provider will also check for areas of bleeding, tissue damage, nerve injury, and a foreign body in your wound.      How is this treated?  Treatment for a facial laceration depends on how severe and deep the wound is. It also depends on the risk for infection. First, your health care provider will clean the wound to prevent infection. Then, your health care provider will decide whether to close the wound. This depends on how deep the laceration is and how long ago your injury happened. If there is an increased risk of infection, the wound will not be closed.•If your wound needs to be closed:  •Your health care provider will use stitches (sutures), skin glue (skin adhesive), or skin adhesive strips to repair the laceration.      •Your health care provider may first numb the area around your wound by injecting a numbing medicine (local anesthetic) in and around your laceration before doing the sutures.      •Torn skin edges or dead skin may be removed.      •If sutures are used, the laceration may be closed in layers. Absorbable sutures will be used for deep tissues and muscle. Removable sutures will be used to close the skin.      •You may be given:  •Pain medicine.      •A tetanus shot.      •Oral antibiotic medicines.      •Antibiotic ointment.          Follow these instructions at home:    Wound care     Follow your health care provider’s instructions for wound care. These instructions will vary depending on how the wound was closed.  For sutures:   •Keep the wound clean and dry.      •If you were given a bandage (dressing), change it at least once a day, or as told by your health care provider. Also change the dressing if it gets wet or dirty.      •Wash the wound with soap and water two times a day, or as told by your health care provider. Rinse off the soap with water. Pat the wound dry with a clean towel.      •After cleaning, apply a thin layer of antibiotic ointment as told by your health care provider. This helps prevent infection and keeps the dressing from sticking to the wound.      •You may shower as usual after the first 24 hours. Do not soak the wound until the sutures are removed.      •Return to have you sutures removed as told by your health care provider.      • Do not wear makeup until your health care provider has approved.    For skin adhesive:    •You may briefly wet your wound in the shower or bath.      • Do not soak or scrub the wound.      • Do not swim.      • Do not sweat heavily until the skin adhesive has fallen off on its own.      •After showering or bathing, gently pat the wound dry with a clean towel.      • Do not apply liquid medicine, cream medicine, ointment, or makeup to your wound while the skin adhesive is in place. This may loosen the film before your wound is healed.      •If you have a dressing over your wound, be careful not to apply tape directly over the skin adhesive. This may pull off the adhesive before the wound is healed.      • Do not spend a long time in the sun or use a tanning lamp while the skin adhesive is in place.      •The skin adhesive will usually remain in place for 5–10 days and then naturally fall off the skin. Do not pick at the adhesive film.    For skin adhesive strips:    •Keep the wound clean and dry.      • Do not let the skin adhesive strips get wet.      •Bathe carefully to keep the wound and adhesive strips dry. If the wound gets wet, pat it dry with a clean towel right away.      •Skin adhesive strips fall off on their own over time. You may trim the strips as the wound heals. Do not remove skin adhesive strips that are still stuck to the wound.        General instructions    •Check your wound area every day for signs of infection. Check for:  •Redness, swelling, or pain.      •Fluid or blood.      •Warmth.      •Pus or a bad smell.        •Take over-the-counter and prescription medicines only as told by your health care provider.      •If you were prescribed an antibiotic, take or apply it as told by your health care provider. Do not stop using the antibiotic even if your condition improves.    •After the laceration has healed:  •Know that it can take a year or two for redness or scarring to fade.      •Apply sunscreen to the skin of your healed wound to minimize scarring. Ultraviolet (UV) rays can darken scar tissue.          Contact a health care provider if:    •You have a fever.      •You have redness, swelling, or pain around your wound.      •You have fluid or blood coming from your wound.      •Your wound feels warm to the touch.      •You have pus or a bad smell coming from your wound.        Get help right away if:    •You have a red streak going away from your wound.        Summary    •You may need treatment for a facial laceration to prevent infection, stop bleeding, help healing, and prevent scarring.      •A deep laceration may be closed with stitches (sutures).      •Follow your health care provider's wound care instructions carefully.      This information is not intended to replace advice given to you by your health care provider. Make sure you discuss any questions you have with your health care provider.      Laceration Care, Adult      A laceration is a cut that may go through all layers of the skin and into the tissue that is right under the skin. Some lacerations heal on their own. Others need to be closed with stitches (sutures), staples, skin adhesive strips, or skin glue. Proper care of a laceration reduces the risk for infection, helps the laceration heal better, and may prevent scarring.      How to care for your laceration    Wash your hands with soap and water before touching your wound or changing your bandage (dressing). If soap and water are not available, use hand .    Keep the wound clean and dry.    If you were given a dressing, you should change it at least once a day, or as told by your health care provider. You should also change it if it becomes wet or dirty.    If sutures or staples were used:     •Keep the wound completely dry for the first 24 hours, or as told by your health care provider. After that time, you may shower or bathe. However, make sure that the wound is not soaked in water until after the sutures or staples have been removed.    •Clean the wound once each day, or as told by your health care provider:  •Wash the wound with soap and water.      •Rinse the wound with water to remove all soap.      •Pat the wound dry with a clean towel. Do not rub the wound.        •After cleaning the wound, apply a thin layer of antibiotic ointment as told by your health care provider. This will help prevent infection and keep the dressing from sticking to the wound.      •Have the sutures or staples removed as told by your health care provider.      If skin adhesive strips were used:     • Do not get the skin adhesive strips wet. You may shower or bathe, but be careful to keep the wound dry.      •If the wound gets wet, pat it dry with a clean towel. Do not rub the wound.      •Skin adhesive strips fall off on their own. You may trim the strips as the wound heals. Do not remove skin adhesive strips that are still stuck to the wound. They will fall off in time.      If skin glue was used:     •Try to keep the wound dry, but you may briefly wet it in the shower or bath. Do not soak the wound in water, such as by swimming.      •After you have showered or bathed, gently pat the wound dry with a clean towel. Do not rub the wound.      • Do not do any activities that will make you sweat heavily until the skin glue has fallen off on its own.      • Do not apply liquid, cream, or ointment medicine to the wound while the skin glue is in place. Using those may loosen the film before the wound has healed.      •If a dressing is placed over the wound, be careful not to apply tape directly over the skin glue. Doing that may cause the glue to be pulled off before the wound has healed.      • Do not pick at the glue. Skin glue usually remains in place for 5–10 days and then falls off the skin.        General instructions      •Take over-the-counter and prescription medicines only as told by your health care provider.      •If you were prescribed an antibiotic medicine or ointment, take or apply it as told by your health care provider. Do not stop using it even if your condition improves.      • Do not scratch or pick at the wound.    •Check your wound every day for signs of infection. Watch for:  •Redness, swelling, or pain.      •Fluid, blood, or pus.        •Raise (elevate) the injured area above the level of your heart while you are sitting or lying down for the first 24–48 hours after the laceration is repaired.    •If directed, put ice on the affected area:  •Put ice in a plastic bag.      •Place a towel between your skin and the bag.      •Leave the ice on for 20 minutes, 2–3 times a day.        •Keep all follow-up visits as told by your health care provider. This is important.        Contact a health care provider if:    •You received a tetanus shot and you have swelling, severe pain, redness, or bleeding at the injection site.      •You have a fever.      •A wound that was closed breaks open.      •You notice a bad smell coming from your wound or your dressing.      •You notice something coming out of the wound, such as wood or glass.      •Your pain is not controlled with medicine.      •You have increased redness, swelling, or pain at the site of your wound.      •You have fluid, blood, or pus coming from your wound.      •You need to change the dressing often due to fluid, blood, or pus that is draining from the wound.      •You develop a new rash.      •You develop numbness around the wound.        Get help right away if:    •You develop severe swelling around the wound.      •Your pain suddenly increases and is severe.      •You develop painful lumps near the wound or on skin anywhere else on your body.      •You have a red streak going away from your wound.      •The wound is on your hand or foot and you cannot properly move a finger or toe.      •The wound is on your hand or foot, and you notice that your fingers or toes look pale or bluish.        Summary    •A laceration is a cut that may go through all layers of the skin and into the tissue that is right under the skin.      •Some lacerations heal on their own. Others need to be closed with stitches (sutures), staples, skin adhesive strips, or skin glue.      •Proper care of a laceration reduces the risk of infection, helps the laceration heal better, and prevents scarring.      This information is not intended to replace advice given to you by your health care provider. Make sure you discuss any questions you have with your health care provider.      Mouth Laceration      A mouth laceration is a deep cut in the lining of your mouth (mucosa). The laceration may extend into your lip or go through your mouth and cheek. Lacerations inside your mouth may involve your tongue, the insides of your cheeks, or the upper surface of your mouth (palate).    Mouth lacerations may bleed a lot because your mouth has a very rich supply of blood. Mouth lacerations may need to be repaired with stitches (sutures).      What are the causes?    This condition is most often caused by your teeth and by any injury that affects your face. The teeth may cut the lining of your mouth.      What are the signs or symptoms?  The most common symptom of a mouth laceration is bleeding. Other symptoms include:  •Pain.      •Feeling a hole or tear in your mouth.        How is this diagnosed?  This condition may be diagnosed with a physical exam of your mouth. Your health care provider may:  •Wash your mouth (irrigate) with a saline solution.       •Remove any blood clots to determine how bad your injury is.       •Take X-rays to rule out other injuries, such as injuries to the teeth, face, or jaw.        How is this treated?  Treatment depends on the location and severity of your injury. Small mouth lacerations may not need treatment if bleeding has stopped. You may need sutures if:  •You have a tongue laceration.      •Your mouth laceration is large or deep, or it continues to bleed.      If sutures are necessary, your health care provider may use absorbable sutures that dissolve as your body heals. You may also receive antibiotic medicine or a tetanus shot.      Follow these instructions at home:     Medicines     •Take over-the-counter and prescription medicines only as told by your health care provider.      •If you were prescribed an antibiotic medicine, take or apply it as told by your health care provider. Do not stop using the antibiotic even if you start to feel better.      • Do not drive or use heavy machinery while taking prescription pain medicine.      Wound care   •Check your wound every day for signs of infection. It is normal to have a white or gray patch over your wound while it heals. Watch for:  •Redness.      •Swelling.      •Blood or pus.        •Gently gargle and rinse your mouth 4–6 times a day. Spit out the liquid. Do not swallow.    •Use the rinse solution recommended by your health care provider, which may include:  •Antibacterial rinse (chlorhexidine).      •Saline rinse.        • Do not poke the sutures with your tongue. Doing that can loosen them.    •If you have a lip laceration:  •Keep the wound completely dry for the first 24 hours, or as told by your health care provider. After that time, you may shower or bathe. Do not soak in water until after the sutures have been removed.      •If you were given a bandage (dressing), you should change it at least once a day, or as told by your health care provider. You should also change it if it becomes wet or dirty.      •Clean the wound once each day, or as told by your health care provider.      •After cleaning the wound, apply a thin layer of antibiotic ointment as told by your health care provider. This can help prevent infection and keep the dressing from sticking to the wound.        General instructions     •Eat a soft diet, and avoid hot foods or liquids for a few days.      •Rinse your mouth after eating.      •Maintain regular oral hygiene. Gently brush your teeth with a soft, nylon-bristled toothbrush 2 times per day.      • Do not use any products that contain nicotine or tobacco, such as cigarettes and e-cigarettes. These can delay healing after injury. If you need help quitting, ask your health care provider.      •Keep all follow-up visits as told by your health care provider. This is important.        Contact a health care provider if:    •Your pain is not controlled with medicine.    •You have:  •A fever.      •Redness, swelling, or pain on your wound, and it is getting worse.      •Fresh bleeding or pus coming from your wound.      •Swollen or tender glands in your throat.          Get help right away if:    •The edges of your wound break open.      •Your face or the area under your jaw becomes swollen.      •You have trouble breathing or swallowing.        Summary    •A mouth laceration is a deep cut in the lining of your mouth.      •Mouth lacerations may bleed a lot because your mouth has a very rich supply of blood.      •While healing from a mouth laceration, check your wound every day for signs of infection.       •Contact a health care provider if you experience fresh bleeding or you notice that pus is coming out of your wound.      This information is not intended to replace advice given to you by your health care provider. Make sure you discuss any questions you have with your health care provider.      Nasal Fracture    WHAT YOU NEED TO KNOW:    A nasal fracture is a crack or break in your nose. You may have a break in the upper nose (bridge), the side, or the septum. The septum is in the middle of the nose and divides your nostrils.    DISCHARGE INSTRUCTIONS:    Return to the emergency department if:   •You feel like one or both of your nasal passages are blocked and you have trouble breathing.      •Clear fluid is leaking from your nose.      •You have severe nose pain, even after you take medicine.      •You have double vision or have problems moving your eyes.      Call your doctor if:   •You have a fever.      •You continue to have nosebleeds.      •You have a headache that gets worse, even after you take pain medicine.      •Your splint or packing is loose.      •You have questions or concerns about your condition or care.      Medicines:   •Medicine may be given to decrease pain or help prevent a bacterial infection. Ask how to take pain medicine safely. Medicine may also be given to decrease nasal swelling and help make breathing easier.       •Take your medicine as directed. Contact your healthcare provider if you think your medicine is not helping or if you have side effects. Tell him or her if you are allergic to any medicine. Keep a list of the medicines, vitamins, and herbs you take. Include the amounts, and when and why you take them. Bring the list or the pill bottles to follow-up visits. Carry your medicine list with you in case of an emergency.      Wound care: Ask your healthcare provider how to care for your wounds, splint, or packing.    How to care for your nasal fracture:   •Apply ice on your nose for 15 to 20 minutes every hour or as directed. Use an ice pack, or put crushed ice in a plastic bag. Cover it with a towel. Ice helps prevent tissue damage and decreases swelling and pain.      •Elevate your head when you lie down. This will help decrease swelling and pain. You may need to see a specialist 3 to 5 days later for tests or more treatment after swelling has gone down.      •Protect your nose to prevent bleeding, bruising, or another fracture. Try not to bump your nose on anything. You may not be able to play sports for up to 6 weeks.      Follow up with a specialist or your doctor in 2 to 5 days as directed: Write down any questions you have so you remember to ask them during your visits. Sometimes follow-up care is needed months or even years later to correct problems.

## 2020-11-11 NOTE — ED ADULT NURSE NOTE - OBJECTIVE STATEMENT
Pt. presents to the ED after falling while walking on [address]. Pt. reports he lost his footing, and was feeling more lethargic / tired than usual, but was almost home so did not stop to rest. Pt. reports he tried to break his fall with his right hand, but still hit his face on the pavement. No LOC, pt. is not on any blood thinners. Pt. has hx of similar falls, never syncope-induced, but "loss of footing". Pt. suffered facial lacs below his right eyebrow and on his right upper lip, and swelling / superficial abrasions to his right hand and wrist. Pt. denies N/V/D, fever, chills, SOB, CP, and dizziness.

## 2020-11-11 NOTE — ED PROVIDER NOTE - PHYSICAL EXAMINATION
Constitutional: Well appearing, well nourished, awake, alert, oriented to person, place, time/situation and in no apparent distress.   Head atraumatic, normocephalic. No signs of trauma  ENMT: Airway patent. +laceration through R eyebrow. + ecchymosis and abrasion to R supraorbital region w/ ttp. + ecchymosis to nasal bridge. no tenderness, crepitus, or ttp. no epistaxis. + laceration through midline upper lip, gaping, through vermillion border. no loose dentition. normal bite. no  malocclusion  Eyes: Clear bilaterally, PERRL, EOMI  Cardiac: Normal rate, regular rhythm.  Heart sounds S1, S2.  No murmurs, rubs or gallops.  Respiratory: Breaths sounds equal and clear b/l. No increased WOB, tachypnea, hypoxia, or accessory mm use. Pt speaks in full sentences. no chest wall trauma  Gastrointestinal: Abd soft, NT, ND, NABS. No guarding, rebound, or rigidity. No pulsatile abdominal masses. No organomegaly appreciated. no abd wall trauma  Musculoskeletal: Range of motion is not limited. no midline spinal ttp throughout the spine. FROM neck w/o midline pain. Pelvis stable. FROM all joints x 4 ext w/o dec ROM, pain. + bruising swelling to dorsum of R hand over the 4th and 5th MTPJs. no rotational deformity. no crepitus. Remainder of extremities unremarkable.   Neuro: Alert and oriented x 3, face symmetric. Strength 5/5 x 4 ext and symmetric, nml gross motor movement, nml gait. No focal deficits noted.   Skin: Skin normal color for race, warm, dry and intact. No evidence of rash.  Psych: Alert and oriented to person, place, time/situation. normal mood and affect.

## 2020-11-11 NOTE — ED PROVIDER NOTE - SECONDARY DIAGNOSIS.
Lip laceration, initial encounter Face lacerations, initial encounter Closed fracture of nasal bone, initial encounter

## 2020-11-11 NOTE — PROCEDURE NOTE - ADDITIONAL PROCEDURE DETAILS
Scar tissue from previous lip repair required excision because of sclerotic tissue, wound would not approximate well.  Fresh edges of orbicularis were approximated with 5-0 vicryl, then the vermilion border was aligned with a submucosal 5-0 vicryl.  White skin, and dry lip and wet lip were approximated with interrupted chromic gut sutures.

## 2020-11-11 NOTE — ED PROVIDER NOTE - CARE PLAN
Principal Discharge DX:	Fall, initial encounter  Secondary Diagnosis:	Lip laceration, initial encounter  Secondary Diagnosis:	Face lacerations, initial encounter   Principal Discharge DX:	Fall, initial encounter  Secondary Diagnosis:	Lip laceration, initial encounter  Secondary Diagnosis:	Face lacerations, initial encounter  Secondary Diagnosis:	Closed fracture of nasal bone, initial encounter

## 2020-12-22 NOTE — ED ADULT NURSE NOTE - DISCHARGE DATE/TIME
OT DAILY TREATMENT NOTE     Patient Name: Adilia Aviles  Date:2020  : 1945  [x]  Patient  Verified  Payor: [place] MEDICARE / Plan: [place] MEDICARE PART A & B / Product Type: Medicare /    In time:1:40 PM  Out time: 2:22 PM  Total Treatment Time (min): 42  Visit #: 3 of 8    Medicare/BCBS Only   Total Timed Codes (min):  27 1:1 Treatment Time:  42     Treatment Area: Right hand pain [M79.641]  Left hand pain [M79.642]    SUBJECTIVE  Pain Level (0-10 scale): 0/10  Any medication changes, allergies to medications, adverse drug reactions, diagnosis change, or new procedure performed?: [x] No    [] Yes (see summary sheet for update)  Subjective functional status/changes:   [] No changes reported  \"I don't have pain today, I just have the numbness and tinling like my hands are asleep and they never wake up. \"    OBJECTIVE    Modality rationale: decrease inflammation and decrease pain to improve the patients ability to use both hands for functional tasks   Min Type Additional Details    [] Estim:  []Unatt       []IFC  []Premod                        []Other:  []w/ice   []w/heat  Position:  Location:    [] Estim: []Att    []TENS instruct  []NMES                    []Other:  []w/US   []w/ice   []w/heat  Position:  Location:    []  Traction: [] Cervical       []Lumbar                       [] Prone          []Supine                       []Intermittent   []Continuous Lbs:  [] before manual  [] after manual    []  Ultrasound: []Continuous   [] Pulsed                           []1MHz   []3MHz W/cm2:  Location:    []  Iontophoresis with dexamethasone         Location: [] Take home patch   [] In clinic   15 []  Ice     []  heat  []  Ice massage  []  Laser   [x]  Paraffin Position: resting  Location: bilateral hands.     []  Laser with stim  []  Other:  Position:  Location:    []  Vasopneumatic Device Pressure:       [] lo [] med [] hi   Temperature: [] lo [] med [] hi       [x] Skin assessment post-treatment: [x]intact []redness- no adverse reaction    []redness - adverse reaction:       10 min Therapeutic Exercise:  [x] See flow sheet :   Rationale: increase ROM and increase strength to improve the patients ability to grasp, , pinch    17 min Therapeutic Activity:  [x]  See flow sheet :   Rationale: increase ROM and improve coordination  to improve the patients ability to manipulate small items. With   [] TE   [] TA   [] neuro   [] other: Patient Education: [x] Review HEP    [] Progressed/Changed HEP based on:   [] positioning   [] body mechanics   [] transfers   [] heat/ice application   [] Splint wear/care   [] Sensory re-education   [] scar management      [] other:            Other Objective/Functional Measures: moderate difficulty manipulating dexterity balls with right hand. Pain Level (0-10 scale) post treatment: 0/10    ASSESSMENT/Changes in Function: Good active participation with interventions during this treatment session. Rest breaks required between tasks. Pt has been provided and instructed in joint protection strategies for review. Patient will continue to benefit from skilled OT services to modify and progress therapeutic interventions, address ROM deficits, address strength deficits, analyze and address soft tissue restrictions and instruct in home and community integration to attain remaining goals. []  See Plan of Care  []  See progress note/recertification  []  See Discharge Summary         Progress towards goals / Updated goals:  Short Term Goals: To be accomplished in 2  weeks:  1. Patient will be compliant with initial home exercise program to take an active role in their rehabilitation process. Status at Eval:  Patient was provided with a basic home exercise program including intrinsic stretches and hand coordination activities  12/18/20: Reviewed HEP with pt. Pt required verbal cues for proper technique.   12/22/2020 - pt reports compliance with intrinsic stretches HEP     2. Patient will demonstrate a good understanding of their condition and strategies for self-management. Status at Eval: Patient received an initial evaluation today followed by education as to diagnosis, precautions and treatment plan.    12/18/20: pt reports she's been trying to perform coordination tasks at home. 12/22/2020 - pt continues to report coordination activities at home.   [address] be accomplished in 6 weeks:              1. Patient will be able to state 4 joint protection techniques to reduce pain in bilateral hands. Status at [facility]: not yet discussed  12/22/2020 - pt provided with joint protection strategies.      2. Patient will report a decrease in pain in bilateral hands after 30 minutes of constant activities. Status at [facility]: NT  12/18/20: Pt reports decrease in pain from 3/10 to 2/10 at the end of the session.     3. Patient will improve functional  strength in bilateral hands to 15# to increase ability to open containers, drinks and sealed bags. Status at [facility] hand 8#, left hand 5#     4. Patient will improve 9 Hole Peg Test speed by 10% in the right hand in order to demonstrate improved dexterity.   Status at [facility]: right hand 30 seconds    PLAN  []  Upgrade activities as tolerated     [x]  Continue plan of care  []  Update interventions per flow sheet       []  Discharge due to:_  []  Other:_      Chely Fisher OT 12/22/2020  1:39 PM    Future Appointments   Date Time Provider Gaby Delarosa   12/22/2020  1:45 PM Christianne Nunn OT [facility]   12/23/2020  1:30 PM Jos Ball PT [facility]   12/28/2020  1:30 PM [facility]   12/28/2020  3:00 PM Christianne Nunn OT [facility]   12/30/2020 10:15 AM Randolph Honeycutt OTR/L [facility]   12/30/2020  1:30 PM Jos Ball PT [facility]   1/4/2021  1:00 PM Christianne Nunn OT [facility]   1/6/2021 10:45 AM Christianne Nunn OT [facility]   1/11/2021 10:45 AM Geoffrey Bosworth, Nicky, OT [facility]   1/13/2021 10:45 AM Lauren Brock, OT [facility]   4/16/2021  1:00 PM [facility] INFUSION PHLEBOTOMIST [facility]   4/19/2021  1:00 PM [facility] FAST TRACK NURSE [facility] 26-Nov-2018 20:53

## 2022-06-01 VITALS
WEIGHT: 164.91 LBS | DIASTOLIC BLOOD PRESSURE: 79 MMHG | OXYGEN SATURATION: 97 % | HEIGHT: 68 IN | TEMPERATURE: 98 F | SYSTOLIC BLOOD PRESSURE: 171 MMHG | RESPIRATION RATE: 16 BRPM | HEART RATE: 68 BPM

## 2022-06-01 LAB
ALBUMIN SERPL ELPH-MCNC: 4.5 G/DL — SIGNIFICANT CHANGE UP (ref 3.3–5)
ALP SERPL-CCNC: 66 U/L — SIGNIFICANT CHANGE UP (ref 40–120)
ALT FLD-CCNC: 35 U/L — SIGNIFICANT CHANGE UP (ref 10–45)
ANION GAP SERPL CALC-SCNC: 11 MMOL/L — SIGNIFICANT CHANGE UP (ref 5–17)
APPEARANCE UR: CLEAR — SIGNIFICANT CHANGE UP
APTT BLD: 28.2 SEC — SIGNIFICANT CHANGE UP (ref 27.5–35.5)
AST SERPL-CCNC: 52 U/L — HIGH (ref 10–40)
BASOPHILS # BLD AUTO: 0.07 K/UL — SIGNIFICANT CHANGE UP (ref 0–0.2)
BASOPHILS NFR BLD AUTO: 0.8 % — SIGNIFICANT CHANGE UP (ref 0–2)
BILIRUB SERPL-MCNC: 1.7 MG/DL — HIGH (ref 0.2–1.2)
BILIRUB UR-MCNC: NEGATIVE — SIGNIFICANT CHANGE UP
BUN SERPL-MCNC: 16 MG/DL — SIGNIFICANT CHANGE UP (ref 7–23)
CALCIUM SERPL-MCNC: 9.3 MG/DL — SIGNIFICANT CHANGE UP (ref 8.4–10.5)
CHLORIDE SERPL-SCNC: 101 MMOL/L — SIGNIFICANT CHANGE UP (ref 96–108)
CK MB CFR SERPL CALC: 2.2 NG/ML — SIGNIFICANT CHANGE UP (ref 0–6.7)
CK SERPL-CCNC: 80 U/L — SIGNIFICANT CHANGE UP (ref 30–200)
CO2 SERPL-SCNC: 26 MMOL/L — SIGNIFICANT CHANGE UP (ref 22–31)
COLOR SPEC: YELLOW — SIGNIFICANT CHANGE UP
CREAT SERPL-MCNC: 1.06 MG/DL — SIGNIFICANT CHANGE UP (ref 0.5–1.3)
DIFF PNL FLD: NEGATIVE — SIGNIFICANT CHANGE UP
EGFR: 69 ML/MIN/1.73M2 — SIGNIFICANT CHANGE UP
EOSINOPHIL # BLD AUTO: 0.12 K/UL — SIGNIFICANT CHANGE UP (ref 0–0.5)
EOSINOPHIL NFR BLD AUTO: 1.3 % — SIGNIFICANT CHANGE UP (ref 0–6)
GLUCOSE SERPL-MCNC: 112 MG/DL — HIGH (ref 70–99)
GLUCOSE UR QL: NEGATIVE — SIGNIFICANT CHANGE UP
HCT VFR BLD CALC: 45.5 % — SIGNIFICANT CHANGE UP (ref 39–50)
HGB BLD-MCNC: 15.7 G/DL — SIGNIFICANT CHANGE UP (ref 13–17)
IMM GRANULOCYTES NFR BLD AUTO: 0.3 % — SIGNIFICANT CHANGE UP (ref 0–1.5)
INR BLD: 0.95 — SIGNIFICANT CHANGE UP (ref 0.88–1.16)
KETONES UR-MCNC: NEGATIVE — SIGNIFICANT CHANGE UP
LACTATE SERPL-SCNC: 1.6 MMOL/L — SIGNIFICANT CHANGE UP (ref 0.5–2)
LEUKOCYTE ESTERASE UR-ACNC: NEGATIVE — SIGNIFICANT CHANGE UP
LIDOCAIN IGE QN: 41 U/L — SIGNIFICANT CHANGE UP (ref 7–60)
LYMPHOCYTES # BLD AUTO: 1.45 K/UL — SIGNIFICANT CHANGE UP (ref 1–3.3)
LYMPHOCYTES # BLD AUTO: 16.1 % — SIGNIFICANT CHANGE UP (ref 13–44)
MCHC RBC-ENTMCNC: 33.5 PG — SIGNIFICANT CHANGE UP (ref 27–34)
MCHC RBC-ENTMCNC: 34.5 GM/DL — SIGNIFICANT CHANGE UP (ref 32–36)
MCV RBC AUTO: 97 FL — SIGNIFICANT CHANGE UP (ref 80–100)
MONOCYTES # BLD AUTO: 0.6 K/UL — SIGNIFICANT CHANGE UP (ref 0–0.9)
MONOCYTES NFR BLD AUTO: 6.6 % — SIGNIFICANT CHANGE UP (ref 2–14)
NEUTROPHILS # BLD AUTO: 6.76 K/UL — SIGNIFICANT CHANGE UP (ref 1.8–7.4)
NEUTROPHILS NFR BLD AUTO: 74.9 % — SIGNIFICANT CHANGE UP (ref 43–77)
NITRITE UR-MCNC: NEGATIVE — SIGNIFICANT CHANGE UP
NRBC # BLD: 0 /100 WBCS — SIGNIFICANT CHANGE UP (ref 0–0)
PH UR: 6 — SIGNIFICANT CHANGE UP (ref 5–8)
PLATELET # BLD AUTO: 163 K/UL — SIGNIFICANT CHANGE UP (ref 150–400)
POTASSIUM SERPL-MCNC: 3.7 MMOL/L — SIGNIFICANT CHANGE UP (ref 3.5–5.3)
POTASSIUM SERPL-SCNC: 3.7 MMOL/L — SIGNIFICANT CHANGE UP (ref 3.5–5.3)
PROT SERPL-MCNC: 7.2 G/DL — SIGNIFICANT CHANGE UP (ref 6–8.3)
PROT UR-MCNC: NEGATIVE MG/DL — SIGNIFICANT CHANGE UP
PROTHROM AB SERPL-ACNC: 11.3 SEC — SIGNIFICANT CHANGE UP (ref 10.5–13.4)
RBC # BLD: 4.69 M/UL — SIGNIFICANT CHANGE UP (ref 4.2–5.8)
RBC # FLD: 12.7 % — SIGNIFICANT CHANGE UP (ref 10.3–14.5)
SARS-COV-2 RNA SPEC QL NAA+PROBE: NEGATIVE — SIGNIFICANT CHANGE UP
SODIUM SERPL-SCNC: 138 MMOL/L — SIGNIFICANT CHANGE UP (ref 135–145)
SP GR SPEC: >=1.03 — SIGNIFICANT CHANGE UP (ref 1–1.03)
TROPONIN T SERPL-MCNC: 0.01 NG/ML — SIGNIFICANT CHANGE UP (ref 0–0.01)
UROBILINOGEN FLD QL: 0.2 E.U./DL — SIGNIFICANT CHANGE UP
WBC # BLD: 9.03 K/UL — SIGNIFICANT CHANGE UP (ref 3.8–10.5)
WBC # FLD AUTO: 9.03 K/UL — SIGNIFICANT CHANGE UP (ref 3.8–10.5)

## 2022-06-01 PROCEDURE — 74177 CT ABD & PELVIS W/CONTRAST: CPT | Mod: 26,MA

## 2022-06-01 PROCEDURE — 99285 EMERGENCY DEPT VISIT HI MDM: CPT

## 2022-06-01 PROCEDURE — 76705 ECHO EXAM OF ABDOMEN: CPT | Mod: 26

## 2022-06-01 RX ORDER — DIATRIZOATE MEGLUMINE 180 MG/ML
30 INJECTION, SOLUTION INTRAVESICAL ONCE
Refills: 0 | Status: COMPLETED | OUTPATIENT
Start: 2022-06-01 | End: 2022-06-01

## 2022-06-01 RX ORDER — MORPHINE SULFATE 50 MG/1
4 CAPSULE, EXTENDED RELEASE ORAL ONCE
Refills: 0 | Status: DISCONTINUED | OUTPATIENT
Start: 2022-06-01 | End: 2022-06-01

## 2022-06-01 RX ORDER — SODIUM CHLORIDE 9 MG/ML
500 INJECTION INTRAMUSCULAR; INTRAVENOUS; SUBCUTANEOUS ONCE
Refills: 0 | Status: COMPLETED | OUTPATIENT
Start: 2022-06-01 | End: 2022-06-01

## 2022-06-01 RX ORDER — ACETAMINOPHEN 500 MG
1000 TABLET ORAL ONCE
Refills: 0 | Status: COMPLETED | OUTPATIENT
Start: 2022-06-01 | End: 2022-06-01

## 2022-06-01 RX ORDER — ONDANSETRON 8 MG/1
4 TABLET, FILM COATED ORAL ONCE
Refills: 0 | Status: COMPLETED | OUTPATIENT
Start: 2022-06-01 | End: 2022-06-01

## 2022-06-01 RX ADMIN — ONDANSETRON 4 MILLIGRAM(S): 8 TABLET, FILM COATED ORAL at 18:29

## 2022-06-01 RX ADMIN — SODIUM CHLORIDE 1000 MILLILITER(S): 9 INJECTION INTRAMUSCULAR; INTRAVENOUS; SUBCUTANEOUS at 22:21

## 2022-06-01 RX ADMIN — DIATRIZOATE MEGLUMINE 30 MILLILITER(S): 180 INJECTION, SOLUTION INTRAVESICAL at 18:28

## 2022-06-01 RX ADMIN — Medication 400 MILLIGRAM(S): at 22:22

## 2022-06-01 RX ADMIN — MORPHINE SULFATE 4 MILLIGRAM(S): 50 CAPSULE, EXTENDED RELEASE ORAL at 19:50

## 2022-06-01 NOTE — ED PROVIDER NOTE - OBJECTIVE STATEMENT
88 y/o M pt with PMHx of HLD presents to ED c/o onset of upper abdominal discomfort and queasiness x 2hrs with associated nausea. Pt denies vomiting, diarrhea, constipation, chest pain or shortness of breath, recent exertional symptoms, cough, fever, black or bloody stools, or any other acute complaints. Pt reports he ate a shrimp salad for lunch which his wife also ate and was not ill. He is belching and has normal BM today.

## 2022-06-01 NOTE — ED PROVIDER NOTE - CLINICAL SUMMARY MEDICAL DECISION MAKING FREE TEXT BOX
86 y/o M pt presents to ED with upper abdominal discomfort, nausea, and belching. Will assess for atypical ACS with EKG and cardiac enzymes, evaluate for intra-abdominal pathology including pancreatitis, bowel obstruction, colitis, and consider gastritis. Plan for labs, CT, UA, anti-emetics, EKG, and dispo pending workup and reevaluation.

## 2022-06-01 NOTE — ED PROVIDER NOTE - PROGRESS NOTE DETAILS
surgery called for consult. CT and US findings suspicious for an acute cholecystitis. pending surgical evaluation and disposition. surgery reccs for admission to surgery/regional under dr fay.

## 2022-06-01 NOTE — ED ADULT NURSE NOTE - OBJECTIVE STATEMENT
Pt A&Ox4, ambulatory with steady gait, speaking in clear/full sentences, no acute distress, vital signs stable. PT presents to the ED for upper abd/chest pain x 2 hours. PT reports pain is aching. Pt denies SOB, radiation of pain. Pt reports some nausea. Pt denies any activity or eating at time pain began.

## 2022-06-01 NOTE — ED ADULT NURSE REASSESSMENT NOTE - NS ED NURSE REASSESS COMMENT FT1
Pt very unsteady on ambulation, per wife more unsteady than usual. Repeat vitals done. PA informed of tachycardia, fever, and O2 saturation of 93%. Pt placed on 3L NC.

## 2022-06-02 ENCOUNTER — INPATIENT (INPATIENT)
Facility: HOSPITAL | Age: 87
LOS: 5 days | Discharge: NURSING, REHAB OR LONG-TERM CARE | DRG: 417 | End: 2022-06-08
Attending: SURGERY | Admitting: SURGERY
Payer: MEDICARE

## 2022-06-02 LAB
ALBUMIN SERPL ELPH-MCNC: 3.6 G/DL — SIGNIFICANT CHANGE UP (ref 3.3–5)
ALP SERPL-CCNC: 88 U/L — SIGNIFICANT CHANGE UP (ref 40–120)
ALT FLD-CCNC: 458 U/L — HIGH (ref 10–45)
ANION GAP SERPL CALC-SCNC: 10 MMOL/L — SIGNIFICANT CHANGE UP (ref 5–17)
AST SERPL-CCNC: 422 U/L — HIGH (ref 10–40)
BILIRUB DIRECT SERPL-MCNC: 1.4 MG/DL — HIGH (ref 0–0.3)
BILIRUB INDIRECT FLD-MCNC: 2 MG/DL — HIGH (ref 0.2–1)
BILIRUB SERPL-MCNC: 3.4 MG/DL — HIGH (ref 0.2–1.2)
BILIRUB SERPL-MCNC: 3.4 MG/DL — HIGH (ref 0.2–1.2)
BLD GP AB SCN SERPL QL: NEGATIVE — SIGNIFICANT CHANGE UP
BLD GP AB SCN SERPL QL: NEGATIVE — SIGNIFICANT CHANGE UP
BUN SERPL-MCNC: 15 MG/DL — SIGNIFICANT CHANGE UP (ref 7–23)
CALCIUM SERPL-MCNC: 8.6 MG/DL — SIGNIFICANT CHANGE UP (ref 8.4–10.5)
CHLORIDE SERPL-SCNC: 101 MMOL/L — SIGNIFICANT CHANGE UP (ref 96–108)
CO2 SERPL-SCNC: 24 MMOL/L — SIGNIFICANT CHANGE UP (ref 22–31)
CREAT SERPL-MCNC: 1.09 MG/DL — SIGNIFICANT CHANGE UP (ref 0.5–1.3)
EGFR: 67 ML/MIN/1.73M2 — SIGNIFICANT CHANGE UP
GLUCOSE SERPL-MCNC: 130 MG/DL — HIGH (ref 70–99)
HCT VFR BLD CALC: 41.3 % — SIGNIFICANT CHANGE UP (ref 39–50)
HGB BLD-MCNC: 14 G/DL — SIGNIFICANT CHANGE UP (ref 13–17)
MAGNESIUM SERPL-MCNC: 1.8 MG/DL — SIGNIFICANT CHANGE UP (ref 1.6–2.6)
MCHC RBC-ENTMCNC: 33.1 PG — SIGNIFICANT CHANGE UP (ref 27–34)
MCHC RBC-ENTMCNC: 33.9 GM/DL — SIGNIFICANT CHANGE UP (ref 32–36)
MCV RBC AUTO: 97.6 FL — SIGNIFICANT CHANGE UP (ref 80–100)
NRBC # BLD: 0 /100 WBCS — SIGNIFICANT CHANGE UP (ref 0–0)
PHOSPHATE SERPL-MCNC: 3 MG/DL — SIGNIFICANT CHANGE UP (ref 2.5–4.5)
PLATELET # BLD AUTO: 142 K/UL — LOW (ref 150–400)
POTASSIUM SERPL-MCNC: 3.8 MMOL/L — SIGNIFICANT CHANGE UP (ref 3.5–5.3)
POTASSIUM SERPL-SCNC: 3.8 MMOL/L — SIGNIFICANT CHANGE UP (ref 3.5–5.3)
PROT SERPL-MCNC: 5.9 G/DL — LOW (ref 6–8.3)
RBC # BLD: 4.23 M/UL — SIGNIFICANT CHANGE UP (ref 4.2–5.8)
RBC # FLD: 12.8 % — SIGNIFICANT CHANGE UP (ref 10.3–14.5)
RH IG SCN BLD-IMP: POSITIVE — SIGNIFICANT CHANGE UP
RH IG SCN BLD-IMP: POSITIVE — SIGNIFICANT CHANGE UP
SODIUM SERPL-SCNC: 135 MMOL/L — SIGNIFICANT CHANGE UP (ref 135–145)
WBC # BLD: 11 K/UL — HIGH (ref 3.8–10.5)
WBC # FLD AUTO: 11 K/UL — HIGH (ref 3.8–10.5)

## 2022-06-02 PROCEDURE — 93010 ELECTROCARDIOGRAM REPORT: CPT

## 2022-06-02 PROCEDURE — 99221 1ST HOSP IP/OBS SF/LOW 40: CPT

## 2022-06-02 PROCEDURE — 71045 X-RAY EXAM CHEST 1 VIEW: CPT | Mod: 26

## 2022-06-02 PROCEDURE — 74181 MRI ABDOMEN W/O CONTRAST: CPT | Mod: 26

## 2022-06-02 RX ORDER — PIPERACILLIN AND TAZOBACTAM 4; .5 G/20ML; G/20ML
3.38 INJECTION, POWDER, LYOPHILIZED, FOR SOLUTION INTRAVENOUS EVERY 6 HOURS
Refills: 0 | Status: DISCONTINUED | OUTPATIENT
Start: 2022-06-02 | End: 2022-06-08

## 2022-06-02 RX ORDER — MAGNESIUM SULFATE 500 MG/ML
1 VIAL (ML) INJECTION ONCE
Refills: 0 | Status: COMPLETED | OUTPATIENT
Start: 2022-06-02 | End: 2022-06-02

## 2022-06-02 RX ORDER — POTASSIUM CHLORIDE 20 MEQ
10 PACKET (EA) ORAL
Refills: 0 | Status: COMPLETED | OUTPATIENT
Start: 2022-06-02 | End: 2022-06-02

## 2022-06-02 RX ORDER — METRONIDAZOLE 500 MG
500 TABLET ORAL EVERY 8 HOURS
Refills: 0 | Status: DISCONTINUED | OUTPATIENT
Start: 2022-06-02 | End: 2022-06-02

## 2022-06-02 RX ORDER — CEFTRIAXONE 500 MG/1
2000 INJECTION, POWDER, FOR SOLUTION INTRAMUSCULAR; INTRAVENOUS EVERY 24 HOURS
Refills: 0 | Status: DISCONTINUED | OUTPATIENT
Start: 2022-06-02 | End: 2022-06-02

## 2022-06-02 RX ORDER — ONDANSETRON 8 MG/1
4 TABLET, FILM COATED ORAL EVERY 6 HOURS
Refills: 0 | Status: DISCONTINUED | OUTPATIENT
Start: 2022-06-02 | End: 2022-06-08

## 2022-06-02 RX ORDER — SIMVASTATIN 20 MG/1
20 TABLET, FILM COATED ORAL AT BEDTIME
Refills: 0 | Status: DISCONTINUED | OUTPATIENT
Start: 2022-06-02 | End: 2022-06-08

## 2022-06-02 RX ORDER — SIMVASTATIN 20 MG/1
1 TABLET, FILM COATED ORAL
Qty: 0 | Refills: 0 | DISCHARGE

## 2022-06-02 RX ORDER — TAMSULOSIN HYDROCHLORIDE 0.4 MG/1
1 CAPSULE ORAL
Qty: 0 | Refills: 0 | DISCHARGE

## 2022-06-02 RX ORDER — SODIUM CHLORIDE 9 MG/ML
1000 INJECTION, SOLUTION INTRAVENOUS
Refills: 0 | Status: DISCONTINUED | OUTPATIENT
Start: 2022-06-02 | End: 2022-06-03

## 2022-06-02 RX ORDER — HEPARIN SODIUM 5000 [USP'U]/ML
5000 INJECTION INTRAVENOUS; SUBCUTANEOUS EVERY 8 HOURS
Refills: 0 | Status: DISCONTINUED | OUTPATIENT
Start: 2022-06-02 | End: 2022-06-05

## 2022-06-02 RX ORDER — TAMSULOSIN HYDROCHLORIDE 0.4 MG/1
0.4 CAPSULE ORAL AT BEDTIME
Refills: 0 | Status: DISCONTINUED | OUTPATIENT
Start: 2022-06-02 | End: 2022-06-08

## 2022-06-02 RX ORDER — INFLUENZA VIRUS VACCINE 15; 15; 15; 15 UG/.5ML; UG/.5ML; UG/.5ML; UG/.5ML
0.7 SUSPENSION INTRAMUSCULAR ONCE
Refills: 0 | Status: DISCONTINUED | OUTPATIENT
Start: 2022-06-02 | End: 2022-06-08

## 2022-06-02 RX ADMIN — PIPERACILLIN AND TAZOBACTAM 200 GRAM(S): 4; .5 INJECTION, POWDER, LYOPHILIZED, FOR SOLUTION INTRAVENOUS at 17:57

## 2022-06-02 RX ADMIN — HEPARIN SODIUM 5000 UNIT(S): 5000 INJECTION INTRAVENOUS; SUBCUTANEOUS at 05:10

## 2022-06-02 RX ADMIN — Medication 100 GRAM(S): at 07:27

## 2022-06-02 RX ADMIN — SODIUM CHLORIDE 105 MILLILITER(S): 9 INJECTION, SOLUTION INTRAVENOUS at 05:10

## 2022-06-02 RX ADMIN — CEFTRIAXONE 100 MILLIGRAM(S): 500 INJECTION, POWDER, FOR SOLUTION INTRAMUSCULAR; INTRAVENOUS at 04:30

## 2022-06-02 RX ADMIN — HEPARIN SODIUM 5000 UNIT(S): 5000 INJECTION INTRAVENOUS; SUBCUTANEOUS at 23:06

## 2022-06-02 RX ADMIN — SODIUM CHLORIDE 105 MILLILITER(S): 9 INJECTION, SOLUTION INTRAVENOUS at 02:26

## 2022-06-02 RX ADMIN — HEPARIN SODIUM 5000 UNIT(S): 5000 INJECTION INTRAVENOUS; SUBCUTANEOUS at 13:36

## 2022-06-02 RX ADMIN — Medication 100 MILLIGRAM(S): at 02:26

## 2022-06-02 RX ADMIN — TAMSULOSIN HYDROCHLORIDE 0.4 MILLIGRAM(S): 0.4 CAPSULE ORAL at 23:06

## 2022-06-02 RX ADMIN — PIPERACILLIN AND TAZOBACTAM 200 GRAM(S): 4; .5 INJECTION, POWDER, LYOPHILIZED, FOR SOLUTION INTRAVENOUS at 23:06

## 2022-06-02 RX ADMIN — Medication 100 MILLIGRAM(S): at 11:02

## 2022-06-02 RX ADMIN — SODIUM CHLORIDE 105 MILLILITER(S): 9 INJECTION, SOLUTION INTRAVENOUS at 23:05

## 2022-06-02 RX ADMIN — Medication 1000 MILLIGRAM(S): at 01:39

## 2022-06-02 RX ADMIN — Medication 100 MILLIEQUIVALENT(S): at 08:33

## 2022-06-02 NOTE — PROGRESS NOTE ADULT - SUBJECTIVE AND OBJECTIVE BOX
SUBJECTIVE: Patient seen and examined bedside. Pt reports feeling well this morning. States his pain is well controlled and denies nausea or vomiting. Remains NPO. Continues to pass flatus.    cefTRIAXone   IVPB 2000 milliGRAM(s) IV Intermittent every 24 hours  heparin   Injectable 5000 Unit(s) SubCutaneous every 8 hours  metroNIDAZOLE  IVPB 500 milliGRAM(s) IV Intermittent every 8 hours  tamsulosin 0.4 milliGRAM(s) Oral at bedtime      Vital Signs Last 24 Hrs  T(C): 36.5 (02 Jun 2022 05:05), Max: 38.1 (01 Jun 2022 21:54)  T(F): 97.7 (02 Jun 2022 05:05), Max: 100.5 (01 Jun 2022 21:54)  HR: 62 (02 Jun 2022 05:05) (62 - 109)  BP: 112/71 (02 Jun 2022 05:05) (111/56 - 186/85)  BP(mean): --  RR: 18 (02 Jun 2022 05:05) (16 - 18)  SpO2: 96% (02 Jun 2022 05:05) (93% - 97%)  I&O's Detail    01 Jun 2022 07:01  -  02 Jun 2022 07:00  --------------------------------------------------------  IN:    Lactated Ringers: 315 mL  Total IN: 315 mL    OUT:    Voided (mL): 175 mL  Total OUT: 175 mL    Total NET: 140 mL          General: NAD, resting comfortably in bed  C/V: NSR  Pulm: Nonlabored breathing, no respiratory distress  Abd: soft, nondistended, mild RUQ TTP, no rebound, no guarding  Extrem: WWP, no edema, SCDs in place        LABS:                        14.0   11.00 )-----------( 142      ( 02 Jun 2022 05:34 )             41.3     06-02    135  |  101  |  15  ----------------------------<  130<H>  3.8   |  24  |  1.09    Ca    8.6      02 Jun 2022 05:34  Phos  3.0     06-02  Mg     1.8     06-02    TPro  5.9<L>  /  Alb  3.6  /  TBili  3.4<H>  /  DBili  1.4<H>  /  AST  422<H>  /  ALT  458<H>  /  AlkPhos  88  06-02    PT/INR - ( 01 Jun 2022 18:28 )   PT: 11.3 sec;   INR: 0.95          PTT - ( 01 Jun 2022 18:28 )  PTT:28.2 sec  Urinalysis Basic - ( 01 Jun 2022 19:55 )    Color: Yellow / Appearance: Clear / SG: >=1.030 / pH: x  Gluc: x / Ketone: NEGATIVE  / Bili: Negative / Urobili: 0.2 E.U./dL   Blood: x / Protein: NEGATIVE mg/dL / Nitrite: NEGATIVE   Leuk Esterase: NEGATIVE / RBC: x / WBC x   Sq Epi: x / Non Sq Epi: x / Bacteria: x        RADIOLOGY & ADDITIONAL STUDIES:

## 2022-06-02 NOTE — PATIENT PROFILE ADULT - FALL HARM RISK - HARM RISK INTERVENTIONS

## 2022-06-02 NOTE — CONSULT NOTE ADULT - ATTENDING COMMENTS
Initial attending contact date  6/2/22    . See fellow note written above for details. I reviewed the fellow documentation. I have personally seen and examined this patient. I reviewed vitals, labs, medications, cardiac studies, and additional imaging. I agree with the above fellow's findings and plans as written above with the following additions/statements.    87M PMHx HLD, hx falls, pericardial effusion in past without tamponade admitted with acute cholecystitis. Cardiology consulted for pre-op cardiac risk assessment (for Dr Naidu)  -pt able to perform > 4 METS without anginal equivalents  -No known CAD, hx CHF  -EKG NSR, RBBB, EFRAÍN  -Obtain limited ECHO - fu effusion (if still present)  -Pt considered low risk for intermediate risk procedure
Pt was seen and examined on 06/02/2022. Agree with the above

## 2022-06-02 NOTE — CONSULT NOTE ADULT - ASSESSMENT
87M PMHx HLD, falls (s/p cards w/u negative), pericardial effusion 2 yr ago, no significant pshx presents w epigastric/substernal pain since 3pm, found to have elevated LTs with presence of stones on imaging, GI consulted for concern for choledocholithiasis and consideration of ERCP.    #Abnormal LTs  Patient presenting with epigastric pain starting 6/1, found to have abnormal LTs which continue to uptrend as of 6/2. CT A/P with findings c/w cholecystitis but normal CBD (6mm). With low grade temp and tachycardic on admission, VSS since admission. ddx includes choledocolithiasis   -Per [place] criteria, with Definite Grade 1 acute mild cholangitis which can be conservatively managed with empiric abxs and supportive care for now. No urgent role for ERCP at this time, will continue to re-evaluate pending clinical progress  -c/w empiric abxs with Ceftriaxone/Flagyl  -Trend LTs with daily CMPs  -f/u MRCP  -Remainder of supportive care as per primary team    #History of Hepatitis   Patient notes remote history of hepatitis many years ago after a trip to [place], possibly after eating oysters. Not clear on which type of hepatitis he had contracted then  -Obtain Hepatitis Panel including Hepatitis A IgM/IgG, Hepatitis C ab, Hep B surface Ab, surface Antigen, Core Antibody    Case discussed with Dr Crockett and primary team.     Judi Kirkland DO  Gastroenterology Fellow 87M PMHx HLD, falls (s/p cards w/u negative), pericardial effusion 2 yr ago, no significant pshx presents w epigastric/substernal pain since 3pm, found to have elevated LTs with presence of stones on imaging, GI consulted for concern for choledocholithiasis and consideration of ERCP.    #Abnormal LTs  Patient presenting with epigastric pain starting 6/1, found to have abnormal LTs which continue to uptrend as of 6/2. CT A/P with findings c/w cholecystitis but normal CBD (6mm). With low grade temp and tachycardic on admission, VSS since admission. ddx includes choledocolithiasis   -Per [place] criteria, with Definite Grade 1 acute mild cholangitis which can be conservatively managed with empiric abxs and supportive care for now. No urgent role for ERCP at this time, will tentatively plan for ERCP tomorrow, 6/3  -c/w empiric abxs with Ceftriaxone/Flagyl  -Trend LTs with daily CMPs  -f/u MRCP  -CLD today (6/2)  -NPO after MN, except for meds with sips of water  -Obtain AM coags (6/3)  -Remainder of supportive care as per primary team    #History of Hepatitis   Patient notes remote history of hepatitis many years ago after a trip to [place], possibly after eating oysters. Not clear on which type of hepatitis he had contracted then  -Obtain Hepatitis Panel including Hepatitis A IgM/IgG, Hepatitis C ab, Hep B surface Ab, surface Antigen, Core Antibody    Case discussed with Dr Crockett and primary team.     Judi Kirkland DO  Gastroenterology Fellow

## 2022-06-02 NOTE — CONSULT NOTE ADULT - ASSESSMENT
88yo M with BPH, HLD presenting with acute cholecystitis with plan for laparascopic cholecystectomy.     cardiology consulted for a preoperative cardiac risk assessment.   He is overall at a low risk for MACE for this intermediate risk procedure.     ECG with NSR, RBBB, LAFB   no additional cardiac w/u indicated  continue simvastatin

## 2022-06-02 NOTE — CONSULT NOTE ADULT - SUBJECTIVE AND OBJECTIVE BOX
HPI:  87M pmh hld, falls (s/p cards w/u negative), pericardial effusion 2 yr ago, no significant pshx presents w epigastric/substernal pain since 3pm. Pt reports he has never had this before. Improved w iv pain medication. Endorses nausea, fever, CP. Denies emesis, SOB. Last BM in AM. Minimal appetite. Last cscope 5 yr ago normal.    PMH: hld, falls (s/p cards w/u negative), pericardial effusion 2 yr ago  PSHx: tonsillectomy  Meds: flomax, simvastatin  All: nkda  SocHx: former daily cigar, daily wine    VS: T100.5, HR low 100s tachycardia, sat 93% on RA    exam  Gen: nad, well appearing, conversational  pulm: nonlabored, no resp distress  abdo: nt, nd, soft  extrem: wwp, nonedematous    labs: wbc 9, cr 1.06; TB 1.7, lactate 1.6, u/a neg, trop neg, covid neg    CT gb distended, sludge, suspicios for cholecystits  US 6mm cbd, sludge, suspicious for cholecystitis   (02 Jun 2022 01:19)    GI consulted for epigastric pain in the setting of abnormal LTs/presence of stones on imaging and for consideration of ERCP.    Allergies    No Known Allergies    Intolerances      MEDICATIONS:  MEDICATIONS  (STANDING):  cefTRIAXone   IVPB 2000 milliGRAM(s) IV Intermittent every 24 hours  heparin   Injectable 5000 Unit(s) SubCutaneous every 8 hours  influenza  Vaccine (HIGH DOSE) 0.7 milliLiter(s) IntraMuscular once  lactated ringers. 1000 milliLiter(s) (105 mL/Hr) IV Continuous <Continuous>  metroNIDAZOLE  IVPB 500 milliGRAM(s) IV Intermittent every 8 hours  simvastatin 20 milliGRAM(s) Oral at bedtime  tamsulosin 0.4 milliGRAM(s) Oral at bedtime    MEDICATIONS  (PRN):  ondansetron Injectable 4 milliGRAM(s) IV Push every 6 hours PRN Nausea and/or Vomiting    PAST MEDICAL & SURGICAL HISTORY:  HLD (hyperlipidemia)        FAMILY HISTORY:    SOCIAL HISTORY:  [place]: [ ] Current, [ ] Former, [ ] Never; Pack Years:  Alcohol:  Illicit Drugs:    REVIEW OF SYSTEMS:  Constitutional: No fever, chills, weight loss, or fatigue  ENMT:  No visual changes; No difficulty hearing, tinnitus, vertigo; No sinus or throat pain  Neck: No pain or stiffness  Respiratory: No cough, wheezing, or hemoptysis; No shortness of breath  Cardiovascular: No chest pain, palpitations, dizziness, orthopnea, PND, or leg swelling  Gastrointestinal: No abdominal or epigastric pain. No  nausea, vomiting, or hematemesis. No diarrhea, constipation, or steatorrhea. No melena or hematochezia  Genitourinary: No dysuria, urinary frequency/hesitancy, or hematuria  Skin: No itching, burning, rashes or lesions   Musculoskeletal: No joint pain or swelling; No muscle, back or extremity pain    Vital Signs Last 24 Hrs  T(C): 36.9 (02 Jun 2022 08:30), Max: 38.1 (01 Jun 2022 21:54)  T(F): 98.4 (02 Jun 2022 08:30), Max: 100.5 (01 Jun 2022 21:54)  HR: 64 (02 Jun 2022 08:30) (62 - 109)  BP: 116/68 (02 Jun 2022 08:30) (111/56 - 186/85)  BP(mean): --  RR: 18 (02 Jun 2022 08:30) (16 - 18)  SpO2: 97% (02 Jun 2022 08:30) (93% - 97%)    06-01 @ 07:01  -  06-02 @ 07:00  --------------------------------------------------------  IN: 315 mL / OUT: 175 mL / NET: 140 mL    06-02 @ 07:01  -  06-02 @ 12:05  --------------------------------------------------------  IN: 0 mL / OUT: 450 mL / NET: -450 mL        PHYSICAL EXAM:    General: elderly male, lying comfortably in bed; in no acute distress; wife at bedside  HEENT: MMM, conjunctiva and sclera clear  Gastrointestinal: Soft, non-tender non-distended; Normal bowel sounds; No rebound or guarding  Extremities: Normal range of motion, No clubbing, cyanosis or edema  Neurological: Alert and oriented x3  Skin: Warm and dry. No obvious rash    LABS:                        14.0   11.00 )-----------( 142      ( 02 Jun 2022 05:34 )             41.3     06-02    135  |  101  |  15  ----------------------------<  130<H>  3.8   |  24  |  1.09    Ca    8.6      02 Jun 2022 05:34  Phos  3.0     06-02  Mg     1.8     06-02    TPro  5.9<L>  /  Alb  3.6  /  TBili  3.4<H>  /  DBili  1.4<H>  /  AST  422<H>  /  ALT  458<H>  /  AlkPhos  88  06-02        RADIOLOGY & ADDITIONAL STUDIES:   
HPI:  87M pmh hld, falls, pericardial effusion 2 yr ago in [place] no issues since, no significant pshx presents w epigastric/substernal pain c/w acute cholecystitis with plan for laparascopic cholecystectomy. He reports being less active than he used to be but can still ambulate without assistance and has no exericise limiting symptoms of dyspnea or chest pain/pressure. He denies any history of MI or stents. only medications he takes are flomax and simvastatin.     PMH: hld, falls (s/p cards w/u negative), pericardial effusion 2 yr ago  PSHx: tonsillectomy  Meds: flomax, simvastatin  All: nkda  SocHx: former daily cigar, daily wine    ROS: A 10-point review of systems was otherwise negative.    PAST MEDICAL & SURGICAL HISTORY:  HLD (hyperlipidemia)        SOCIAL HISTORY:  FAMILY HISTORY:    ALLERGIES: 	  No Known Allergies      MEDICATIONS:  heparin   Injectable 5000 Unit(s) SubCutaneous every 8 hours  influenza  Vaccine (HIGH DOSE) 0.7 milliLiter(s) IntraMuscular once  lactated ringers. 1000 milliLiter(s) IV Continuous <Continuous>  ondansetron Injectable 4 milliGRAM(s) IV Push every 6 hours PRN  piperacillin/tazobactam IVPB.. 3.375 Gram(s) IV Intermittent every 6 hours  simvastatin 20 milliGRAM(s) Oral at bedtime  tamsulosin 0.4 milliGRAM(s) Oral at bedtime      PHYSICAL EXAM:  T(C): 37.3 (06-02-22 @ 16:52), Max: 38.1 (06-01-22 @ 21:54)  HR: 72 (06-02-22 @ 16:52) (62 - 109)  BP: 134/70 (06-02-22 @ 16:52) (111/56 - 186/85)  RR: 16 (06-02-22 @ 16:52) (16 - 18)  SpO2: 95% (06-02-22 @ 16:52) (93% - 97%)  Wt(kg): --    GEN: Awake, comfortable. NAD.   HEENT: NCAT, PERRL, EOMI. Mucosa moist. No JVD.   RESP: CTA b/l  CV: RRR, normal s1/s2. No m/r/g.  ABD: Soft, NTND. BS+  EXT: Warm. No edema, clubbing, or cyanosis.   NEURO: AAOx3. No focal deficits.    I&O's Summary    01 Jun 2022 07:01  -  02 Jun 2022 07:00  --------------------------------------------------------  IN: 315 mL / OUT: 175 mL / NET: 140 mL    02 Jun 2022 07:01  -  02 Jun 2022 17:00  --------------------------------------------------------  IN: 1145 mL / OUT: 730 mL / NET: 415 mL      Height (cm): 172.7 (06-02 @ 03:30)  Weight (kg): 74.8 (06-02 @ 03:30)  BMI (kg/m2): 25.1 (06-02 @ 03:30)  BSA (m2): 1.88 (06-02 @ 03:30)  LABS:	 	                        14.0   11.00 )-----------( 142      ( 02 Jun 2022 05:34 )             41.3     06-02    135  |  101  |  15  ----------------------------<  130<H>  3.8   |  24  |  1.09    Ca    8.6      02 Jun 2022 05:34  Phos  3.0     06-02  Mg     1.8     06-02    TPro  5.9<L>  /  Alb  3.6  /  TBili  3.4<H>  /  DBili  1.4<H>  /  AST  422<H>  /  ALT  458<H>  /  AlkPhos  88  06-02    CARDIAC MARKERS ( 01 Jun 2022 18:28 )  x     / 0.01 ng/mL / 80 U/L / x     / 2.2 ng/mL      ECG - NSR RBBB, LAFB no ischemic changes

## 2022-06-02 NOTE — PROGRESS NOTE ADULT - ASSESSMENT
87M pmh hld, falls (s/p cards w/u negative), pericardial effusion 2 yr ago, no significant pshx presents w epigastric/substernal pain a/w fever, nausea & decreased appetite. Febrile (100.5) and tachy (100s) upon ED presentation. Labs significant for TBil 1.7 (direct 0.6). US and CT (6/1) suspicious for acute cholecystitis, also notable for small rt inguinal hernia w/ SB loop w/o obstruction    NPO/IVF  Pain & nausea control prn  IVABx (CTX/Flagyl)  SCD/SQH  OOBA/IS  AM labs  Fall precautions

## 2022-06-02 NOTE — H&P ADULT - HISTORY OF PRESENT ILLNESS
87M pmh hld, falls (s/p cards w/u negative), pericardial effusion 2 yr ago, no significant pshx presents w epigastric/substernal pain since 3pm. Pt reports he has never had this before. Improved w iv pain medication. Endorses nausea, fever, CP. Denies emesis, SOB. Last BM in AM. Minimal appetite. Last cscope 5 yr ago normal.    PMH: hld, falls (s/p cards w/u negative), pericardial effusion 2 yr ago  PSHx: tonsillectomy  Meds: flomax, simvastatin  All: nkda  SocHx: former daily cigar, daily wine    VS: T100.5, HR low 100s tachycardia, sat 93% on RA    exam  Gen: nad, well appearing, conversational  pulm: nonlabored, no resp distress  abdo: nt, nd, soft  extrem: wwp, nonedematous    labs: wbc 9, cr 1.06; TB 1.7, lactate 1.6, u/a neg, trop neg, covid neg    CT gb distended, sludge, suspicios for cholecystits  US 6mm cbd, sludge, suspicious for cholecystitis

## 2022-06-02 NOTE — H&P ADULT - ASSESSMENT
87M pmh hld, falls (s/p cards w/u negative), pericardial effusion 2 yr ago, no significant pshx presents w epigastric/substernal pain since 3pm.     preop for OR  NPO/IVF  SCD/SQH  IS/OOB  Fall precautions  Cef/flagyl  Regional  Dr. Morris  AM labs, see bilirubin

## 2022-06-03 LAB
ALBUMIN SERPL ELPH-MCNC: 3.2 G/DL — LOW (ref 3.3–5)
ALP SERPL-CCNC: 83 U/L — SIGNIFICANT CHANGE UP (ref 40–120)
ALT FLD-CCNC: 251 U/L — HIGH (ref 10–45)
ANION GAP SERPL CALC-SCNC: 14 MMOL/L — SIGNIFICANT CHANGE UP (ref 5–17)
APPEARANCE UR: CLEAR — SIGNIFICANT CHANGE UP
AST SERPL-CCNC: 122 U/L — HIGH (ref 10–40)
BACTERIA # UR AUTO: PRESENT /HPF
BILIRUB SERPL-MCNC: 4 MG/DL — HIGH (ref 0.2–1.2)
BILIRUB UR-MCNC: NEGATIVE — SIGNIFICANT CHANGE UP
BUN SERPL-MCNC: 13 MG/DL — SIGNIFICANT CHANGE UP (ref 7–23)
CALCIUM SERPL-MCNC: 8.4 MG/DL — SIGNIFICANT CHANGE UP (ref 8.4–10.5)
CHLORIDE SERPL-SCNC: 102 MMOL/L — SIGNIFICANT CHANGE UP (ref 96–108)
CO2 SERPL-SCNC: 20 MMOL/L — LOW (ref 22–31)
COLOR SPEC: YELLOW — SIGNIFICANT CHANGE UP
COMMENT - URINE: SIGNIFICANT CHANGE UP
CREAT SERPL-MCNC: 1.05 MG/DL — SIGNIFICANT CHANGE UP (ref 0.5–1.3)
DIFF PNL FLD: ABNORMAL
EGFR: 70 ML/MIN/1.73M2 — SIGNIFICANT CHANGE UP
EPI CELLS # UR: SIGNIFICANT CHANGE UP /HPF (ref 0–5)
GLUCOSE SERPL-MCNC: 89 MG/DL — SIGNIFICANT CHANGE UP (ref 70–99)
GLUCOSE UR QL: NEGATIVE — SIGNIFICANT CHANGE UP
HAV IGM SER-ACNC: SIGNIFICANT CHANGE UP
HBV CORE AB SER-ACNC: SIGNIFICANT CHANGE UP
HBV CORE IGM SER-ACNC: SIGNIFICANT CHANGE UP
HBV SURFACE AB SER-ACNC: SIGNIFICANT CHANGE UP
HBV SURFACE AG SER-ACNC: SIGNIFICANT CHANGE UP
HCT VFR BLD CALC: 38.4 % — LOW (ref 39–50)
HCV AB S/CO SERPL IA: 0.05 S/CO — SIGNIFICANT CHANGE UP
HCV AB SERPL-IMP: SIGNIFICANT CHANGE UP
HGB BLD-MCNC: 13.4 G/DL — SIGNIFICANT CHANGE UP (ref 13–17)
KETONES UR-MCNC: 15 MG/DL
LEUKOCYTE ESTERASE UR-ACNC: NEGATIVE — SIGNIFICANT CHANGE UP
MAGNESIUM SERPL-MCNC: 1.8 MG/DL — SIGNIFICANT CHANGE UP (ref 1.6–2.6)
MCHC RBC-ENTMCNC: 33.8 PG — SIGNIFICANT CHANGE UP (ref 27–34)
MCHC RBC-ENTMCNC: 34.9 GM/DL — SIGNIFICANT CHANGE UP (ref 32–36)
MCV RBC AUTO: 96.7 FL — SIGNIFICANT CHANGE UP (ref 80–100)
NITRITE UR-MCNC: NEGATIVE — SIGNIFICANT CHANGE UP
NRBC # BLD: 0 /100 WBCS — SIGNIFICANT CHANGE UP (ref 0–0)
PH UR: 6 — SIGNIFICANT CHANGE UP (ref 5–8)
PHOSPHATE SERPL-MCNC: 1.6 MG/DL — LOW (ref 2.5–4.5)
PLATELET # BLD AUTO: 107 K/UL — LOW (ref 150–400)
POTASSIUM SERPL-MCNC: 3.5 MMOL/L — SIGNIFICANT CHANGE UP (ref 3.5–5.3)
POTASSIUM SERPL-SCNC: 3.5 MMOL/L — SIGNIFICANT CHANGE UP (ref 3.5–5.3)
PROT SERPL-MCNC: 5.6 G/DL — LOW (ref 6–8.3)
PROT UR-MCNC: ABNORMAL MG/DL
RBC # BLD: 3.97 M/UL — LOW (ref 4.2–5.8)
RBC # FLD: 12.9 % — SIGNIFICANT CHANGE UP (ref 10.3–14.5)
RBC CASTS # UR COMP ASSIST: < 5 /HPF — SIGNIFICANT CHANGE UP
SODIUM SERPL-SCNC: 136 MMOL/L — SIGNIFICANT CHANGE UP (ref 135–145)
SP GR SPEC: 1.01 — SIGNIFICANT CHANGE UP (ref 1–1.03)
UROBILINOGEN FLD QL: 0.2 E.U./DL — SIGNIFICANT CHANGE UP
WBC # BLD: 6.99 K/UL — SIGNIFICANT CHANGE UP (ref 3.8–10.5)
WBC # FLD AUTO: 6.99 K/UL — SIGNIFICANT CHANGE UP (ref 3.8–10.5)
WBC UR QL: < 5 /HPF — SIGNIFICANT CHANGE UP

## 2022-06-03 PROCEDURE — 71045 X-RAY EXAM CHEST 1 VIEW: CPT | Mod: 26

## 2022-06-03 PROCEDURE — 93306 TTE W/DOPPLER COMPLETE: CPT | Mod: 26

## 2022-06-03 RX ORDER — POTASSIUM PHOSPHATE, MONOBASIC POTASSIUM PHOSPHATE, DIBASIC 236; 224 MG/ML; MG/ML
30 INJECTION, SOLUTION INTRAVENOUS ONCE
Refills: 0 | Status: COMPLETED | OUTPATIENT
Start: 2022-06-03 | End: 2022-06-03

## 2022-06-03 RX ORDER — ACETAMINOPHEN 500 MG
1000 TABLET ORAL ONCE
Refills: 0 | Status: COMPLETED | OUTPATIENT
Start: 2022-06-03 | End: 2022-06-03

## 2022-06-03 RX ORDER — MAGNESIUM SULFATE 500 MG/ML
1 VIAL (ML) INJECTION ONCE
Refills: 0 | Status: COMPLETED | OUTPATIENT
Start: 2022-06-03 | End: 2022-06-03

## 2022-06-03 RX ORDER — SODIUM CHLORIDE 9 MG/ML
1000 INJECTION, SOLUTION INTRAVENOUS
Refills: 0 | Status: DISCONTINUED | OUTPATIENT
Start: 2022-06-03 | End: 2022-06-03

## 2022-06-03 RX ADMIN — SIMVASTATIN 20 MILLIGRAM(S): 20 TABLET, FILM COATED ORAL at 21:20

## 2022-06-03 RX ADMIN — PIPERACILLIN AND TAZOBACTAM 200 GRAM(S): 4; .5 INJECTION, POWDER, LYOPHILIZED, FOR SOLUTION INTRAVENOUS at 11:11

## 2022-06-03 RX ADMIN — TAMSULOSIN HYDROCHLORIDE 0.4 MILLIGRAM(S): 0.4 CAPSULE ORAL at 21:20

## 2022-06-03 RX ADMIN — PIPERACILLIN AND TAZOBACTAM 200 GRAM(S): 4; .5 INJECTION, POWDER, LYOPHILIZED, FOR SOLUTION INTRAVENOUS at 18:14

## 2022-06-03 RX ADMIN — HEPARIN SODIUM 5000 UNIT(S): 5000 INJECTION INTRAVENOUS; SUBCUTANEOUS at 21:20

## 2022-06-03 RX ADMIN — SODIUM CHLORIDE 105 MILLILITER(S): 9 INJECTION, SOLUTION INTRAVENOUS at 18:14

## 2022-06-03 RX ADMIN — SODIUM CHLORIDE 105 MILLILITER(S): 9 INJECTION, SOLUTION INTRAVENOUS at 05:53

## 2022-06-03 RX ADMIN — Medication 100 GRAM(S): at 07:47

## 2022-06-03 RX ADMIN — HEPARIN SODIUM 5000 UNIT(S): 5000 INJECTION INTRAVENOUS; SUBCUTANEOUS at 14:49

## 2022-06-03 RX ADMIN — Medication 400 MILLIGRAM(S): at 10:55

## 2022-06-03 RX ADMIN — HEPARIN SODIUM 5000 UNIT(S): 5000 INJECTION INTRAVENOUS; SUBCUTANEOUS at 05:52

## 2022-06-03 RX ADMIN — Medication 1000 MILLIGRAM(S): at 11:10

## 2022-06-03 RX ADMIN — PIPERACILLIN AND TAZOBACTAM 200 GRAM(S): 4; .5 INJECTION, POWDER, LYOPHILIZED, FOR SOLUTION INTRAVENOUS at 05:52

## 2022-06-03 RX ADMIN — POTASSIUM PHOSPHATE, MONOBASIC POTASSIUM PHOSPHATE, DIBASIC 83.33 MILLIMOLE(S): 236; 224 INJECTION, SOLUTION INTRAVENOUS at 11:56

## 2022-06-03 NOTE — PROGRESS NOTE ADULT - SUBJECTIVE AND OBJECTIVE BOX
SUBJECTIVE: Pt seen and examined by chief resident. Pt is doing well, resting comfortably on bed. Pain controlled. +BM. No nausea or vomiting. No complaints at this time.    Vital Signs Last 24 Hrs  T(C): 37.4 (03 Jun 2022 12:04), Max: 38.6 (03 Jun 2022 10:35)  T(F): 99.4 (03 Jun 2022 12:04), Max: 101.4 (03 Jun 2022 10:35)  HR: 97 (03 Jun 2022 12:04) (66 - 111)  BP: 122/66 (03 Jun 2022 12:04) (122/66 - 157/81)  BP(mean): --  RR: 18 (03 Jun 2022 12:04) (16 - 20)  SpO2: 94% (03 Jun 2022 12:04) (94% - 97%)    I&O's Summary    02 Jun 2022 07:01  -  03 Jun 2022 07:00  --------------------------------------------------------  IN: 2610 mL / OUT: 1480 mL / NET: 1130 mL    03 Jun 2022 07:01  -  03 Jun 2022 12:06  --------------------------------------------------------  IN: 476.6 mL / OUT: 250 mL / NET: 226.6 mL      Physical Exam:  General Appearance: Appears well, NAD  Pulmonary: Nonlabored breathing, no respiratory distress  Abdomen: Soft, nondisteded, nontender  Extremities: WWP, SCD's in place     LABS:                        13.4   6.99  )-----------( 107      ( 03 Jun 2022 06:10 )             38.4     06-03    136  |  102  |  13  ----------------------------<  89  3.5   |  20<L>  |  1.05    Ca    8.4      03 Jun 2022 06:10  Phos  1.6     06-03  Mg     1.8     06-03    TPro  5.6<L>  /  Alb  3.2<L>  /  TBili  4.0<H>  /  DBili  x   /  AST  122<H>  /  ALT  251<H>  /  AlkPhos  83  06-03    PT/INR - ( 01 Jun 2022 18:28 )   PT: 11.3 sec;   INR: 0.95          PTT - ( 01 Jun 2022 18:28 )  PTT:28.2 sec  Urinalysis Basic - ( 01 Jun 2022 19:55 )    Color: Yellow / Appearance: Clear / SG: >=1.030 / pH: x  Gluc: x / Ketone: NEGATIVE  / Bili: Negative / Urobili: 0.2 E.U./dL   Blood: x / Protein: NEGATIVE mg/dL / Nitrite: NEGATIVE   Leuk Esterase: NEGATIVE / RBC: x / WBC x   Sq Epi: x / Non Sq Epi: x / Bacteria: x

## 2022-06-03 NOTE — CHART NOTE - NSCHARTNOTEFT_GEN_A_CORE
EUS performed in Endoscopy suite with Dr Crockett, findings as noted below:    Impression:  Celiac area was seen first and was without adenopathy. Pancreatic body and tail were visualized from the gastric body and were without abnormalities. Head of the pancreas was visualized from the duodenum and was normal. CBD was without evidence of stones or sludge in it, small in diameter, 2 mm. PD was also normal 1.7 mm in diameter     Plan:  -Refer to Surgery for cholecystectomy

## 2022-06-03 NOTE — PROGRESS NOTE ADULT - ASSESSMENT
86yo M with BPH, HLD presenting with acute cholecystitis with plan for laparascopic cholecystectomy.   cardiology consulted for a preoperative cardiac risk assessment.   He is overall at a low risk for MACE for this intermediate risk procedure.   ECG with NSR, RBBB, LAFB   no additional cardiac w/u indicated at this time.   Will need outpatient MPI

## 2022-06-03 NOTE — PROGRESS NOTE ADULT - ASSESSMENT
87M pmh hld, falls (s/p cards w/u negative), pericardial effusion 2 yr ago, no significant pshx presents w epigastric/substernal pain a/w fever, nausea & decreased appetite. Febrile (100.5) and tachy (100s) upon ED presentation. Labs significant for TBil 1.7 (direct 0.6). US and CT (6/1) suspicious for acute cholecystitis    NPO/IVF  Pain & nausea control prn  IVABx (Zosyn)  SCD/SQH  OOBA/IS  AM labs  GI Consult

## 2022-06-03 NOTE — PROGRESS NOTE ADULT - SUBJECTIVE AND OBJECTIVE BOX
Interventional Cardiology Adult Progress Note    Subjective Assessment: No chest pain or dyspnea overnight  	  MEDICATIONS:  tamsulosin 0.4 milliGRAM(s) Oral at bedtime    piperacillin/tazobactam IVPB.. 3.375 Gram(s) IV Intermittent every 6 hours      ondansetron Injectable 4 milliGRAM(s) IV Push every 6 hours PRN      simvastatin 20 milliGRAM(s) Oral at bedtime    heparin   Injectable 5000 Unit(s) SubCutaneous every 8 hours  influenza  Vaccine (HIGH DOSE) 0.7 milliLiter(s) IntraMuscular once  lactated ringers. 1000 milliLiter(s) IV Continuous <Continuous>  potassium phosphate IVPB 30 milliMole(s) IV Intermittent once      	    [PHYSICAL EXAM:  TELEMETRY:  T(C): 37.4 (06-03-22 @ 05:23), Max: 37.4 (06-03-22 @ 05:23)  HR: 84 (06-03-22 @ 05:23) (67 - 84)  BP: 144/72 (06-03-22 @ 05:23) (133/74 - 149/69)  RR: 17 (06-03-22 @ 05:23) (16 - 18)  SpO2: 94% (06-03-22 @ 05:23) (94% - 97%)  Wt(kg): --  I&O's Summary    02 Jun 2022 07:01  -  03 Jun 2022 07:00  --------------------------------------------------------  IN: 2610 mL / OUT: 1480 mL / NET: 1130 mL        Estrella:  Central/PICC/Mid Line:                                         Appearance: Normal	  HEENT:   Normal oral mucosa, PERRL, EOMI	  Neck: Supple,  - JVD; Carotid Bruit   Cardiovascular: Normal S1 S2, No JVD, No murmurs,   Respiratory: Lungs clear to auscultation/Decreased Breath Sounds/No Rales, Rhonchi, Wheezing	  	    ECG:  	NSR, RBBB, LAFB  RADIOLOGY:   DIAGNOSTIC TESTING:  [ ] Echocardiogram:  [ ]  Catheterization:  [ ] Stress Test:    [ ] JOSE JUAN  OTHER: 	    LABS:	 	  CARDIAC MARKERS:                                  13.4   6.99  )-----------( 107      ( 03 Jun 2022 06:10 )             38.4     06-03    136  |  102  |  13  ----------------------------<  89  3.5   |  20<L>  |  1.05    Ca    8.4      03 Jun 2022 06:10  Phos  1.6     06-03  Mg     1.8     06-03    TPro  5.6<L>  /  Alb  3.2<L>  /  TBili  4.0<H>  /  DBili  x   /  AST  122<H>  /  ALT  251<H>  /  AlkPhos  83  06-03    proBNP:   Lipid Profile:   HgA1c:   TSH:   PT/INR - ( 01 Jun 2022 18:28 )   PT: 11.3 sec;   INR: 0.95          PTT - ( 01 Jun 2022 18:28 )  PTT:28.2 sec

## 2022-06-04 LAB
ALBUMIN SERPL ELPH-MCNC: 2.7 G/DL — LOW (ref 3.3–5)
ALP SERPL-CCNC: SIGNIFICANT CHANGE UP (ref 40–120)
ALT FLD-CCNC: SIGNIFICANT CHANGE UP (ref 10–45)
ANION GAP SERPL CALC-SCNC: 10 MMOL/L — SIGNIFICANT CHANGE UP (ref 5–17)
AST SERPL-CCNC: SIGNIFICANT CHANGE UP (ref 10–40)
BILIRUB SERPL-MCNC: 2 MG/DL — HIGH (ref 0.2–1.2)
BUN SERPL-MCNC: 14 MG/DL — SIGNIFICANT CHANGE UP (ref 7–23)
CALCIUM SERPL-MCNC: 8.2 MG/DL — LOW (ref 8.4–10.5)
CHLORIDE SERPL-SCNC: 105 MMOL/L — SIGNIFICANT CHANGE UP (ref 96–108)
CO2 SERPL-SCNC: 23 MMOL/L — SIGNIFICANT CHANGE UP (ref 22–31)
CREAT SERPL-MCNC: 1.22 MG/DL — SIGNIFICANT CHANGE UP (ref 0.5–1.3)
EGFR: 58 ML/MIN/1.73M2 — LOW
GLUCOSE SERPL-MCNC: 87 MG/DL — SIGNIFICANT CHANGE UP (ref 70–99)
HCT VFR BLD CALC: 39.8 % — SIGNIFICANT CHANGE UP (ref 39–50)
HGB BLD-MCNC: 13.3 G/DL — SIGNIFICANT CHANGE UP (ref 13–17)
MAGNESIUM SERPL-MCNC: 2.1 MG/DL — SIGNIFICANT CHANGE UP (ref 1.6–2.6)
MCHC RBC-ENTMCNC: 32.9 PG — SIGNIFICANT CHANGE UP (ref 27–34)
MCHC RBC-ENTMCNC: 33.4 GM/DL — SIGNIFICANT CHANGE UP (ref 32–36)
MCV RBC AUTO: 98.5 FL — SIGNIFICANT CHANGE UP (ref 80–100)
NRBC # BLD: 0 /100 WBCS — SIGNIFICANT CHANGE UP (ref 0–0)
PHOSPHATE SERPL-MCNC: 2.5 MG/DL — SIGNIFICANT CHANGE UP (ref 2.5–4.5)
PLATELET # BLD AUTO: 76 K/UL — LOW (ref 150–400)
POTASSIUM SERPL-MCNC: SIGNIFICANT CHANGE UP (ref 3.5–5.3)
POTASSIUM SERPL-SCNC: SIGNIFICANT CHANGE UP (ref 3.5–5.3)
PROT SERPL-MCNC: 5.6 G/DL — LOW (ref 6–8.3)
RBC # BLD: 4.04 M/UL — LOW (ref 4.2–5.8)
RBC # FLD: 13.3 % — SIGNIFICANT CHANGE UP (ref 10.3–14.5)
SARS-COV-2 RNA SPEC QL NAA+PROBE: NEGATIVE — SIGNIFICANT CHANGE UP
SODIUM SERPL-SCNC: 138 MMOL/L — SIGNIFICANT CHANGE UP (ref 135–145)
WBC # BLD: 8.66 K/UL — SIGNIFICANT CHANGE UP (ref 3.8–10.5)
WBC # FLD AUTO: 8.66 K/UL — SIGNIFICANT CHANGE UP (ref 3.8–10.5)

## 2022-06-04 PROCEDURE — 88304 TISSUE EXAM BY PATHOLOGIST: CPT | Mod: 26

## 2022-06-04 RX ORDER — SODIUM CHLORIDE 9 MG/ML
1000 INJECTION, SOLUTION INTRAVENOUS
Refills: 0 | Status: DISCONTINUED | OUTPATIENT
Start: 2022-06-04 | End: 2022-06-06

## 2022-06-04 RX ADMIN — PIPERACILLIN AND TAZOBACTAM 200 GRAM(S): 4; .5 INJECTION, POWDER, LYOPHILIZED, FOR SOLUTION INTRAVENOUS at 06:22

## 2022-06-04 RX ADMIN — SIMVASTATIN 20 MILLIGRAM(S): 20 TABLET, FILM COATED ORAL at 21:14

## 2022-06-04 RX ADMIN — PIPERACILLIN AND TAZOBACTAM 200 GRAM(S): 4; .5 INJECTION, POWDER, LYOPHILIZED, FOR SOLUTION INTRAVENOUS at 12:30

## 2022-06-04 RX ADMIN — HEPARIN SODIUM 5000 UNIT(S): 5000 INJECTION INTRAVENOUS; SUBCUTANEOUS at 14:26

## 2022-06-04 RX ADMIN — HEPARIN SODIUM 5000 UNIT(S): 5000 INJECTION INTRAVENOUS; SUBCUTANEOUS at 06:22

## 2022-06-04 RX ADMIN — PIPERACILLIN AND TAZOBACTAM 200 GRAM(S): 4; .5 INJECTION, POWDER, LYOPHILIZED, FOR SOLUTION INTRAVENOUS at 18:36

## 2022-06-04 RX ADMIN — HEPARIN SODIUM 5000 UNIT(S): 5000 INJECTION INTRAVENOUS; SUBCUTANEOUS at 21:14

## 2022-06-04 RX ADMIN — TAMSULOSIN HYDROCHLORIDE 0.4 MILLIGRAM(S): 0.4 CAPSULE ORAL at 21:14

## 2022-06-04 RX ADMIN — PIPERACILLIN AND TAZOBACTAM 200 GRAM(S): 4; .5 INJECTION, POWDER, LYOPHILIZED, FOR SOLUTION INTRAVENOUS at 00:12

## 2022-06-04 NOTE — PROGRESS NOTE ADULT - ASSESSMENT
87M pmh hld, falls (s/p cards w/u negative), pericardial effusion 2 yr ago, no significant pshx presents w epigastric/substernal pain a/w fever, nausea & decreased appetite. Febrile (100.5) and tachy (100s) upon ED presentation. Labs significant for TBil 1.7 (direct 0.6). US and CT (6/1) suspicious for acute cholecystitis s/p EUS no stones, normal CBD    CLD  Pain & nausea control prn  IVABx (Zosyn)  SCD/SQH  OOBA/IS  AM labs  Fall precautions

## 2022-06-04 NOTE — PROGRESS NOTE ADULT - SUBJECTIVE AND OBJECTIVE BOX
GASTROENTEROLOGY PROGRESS NOTE  Patient seen and examined at bedside. s/p EUS (6/3) - Celiac area was seen first and was without adenopathy. Pancreatic body and tail were visualized from the gastric body and were without abnormalities. Head of the pancreas was visualized from the duodenum and was normal. CBD was without evidence of stones or sludge in it, small in diameter, 2 mm. PD was also normal 1.7 mm in diameter     ROS: Patient notes occasional RUQ/epigastric pain, mainly after eating. No nausea/vomiting or fevers/chills.    PERTINENT REVIEW OF SYSTEMS:  CONSTITUTIONAL: No weakness, fevers or chills  HEENT: No visual changes; No vertigo or throat pain   GASTROINTESTINAL: As above.  NEUROLOGICAL: No numbness or weakness  SKIN: No itching, burning, rashes, or lesions     Allergies    No Known Allergies    Intolerances      MEDICATIONS:  MEDICATIONS  (STANDING):  heparin   Injectable 5000 Unit(s) SubCutaneous every 8 hours  influenza  Vaccine (HIGH DOSE) 0.7 milliLiter(s) IntraMuscular once  piperacillin/tazobactam IVPB.. 3.375 Gram(s) IV Intermittent every 6 hours  simvastatin 20 milliGRAM(s) Oral at bedtime  tamsulosin 0.4 milliGRAM(s) Oral at bedtime    MEDICATIONS  (PRN):  ondansetron Injectable 4 milliGRAM(s) IV Push every 6 hours PRN Nausea and/or Vomiting    Vital Signs Last 24 Hrs  T(C): 36.4 (2022 08:23), Max: 36.8 (2022 06:15)  T(F): 97.6 (2022 08:23), Max: 98.2 (2022 06:15)  HR: 63 (2022 08:23) (60 - 73)  BP: 135/59 (2022 08:23) (94/52 - 135/59)  BP(mean): --  RR: 18 (2022 08:23) (17 - 18)  SpO2: 98% (2022 08:23) (96% - 98%)    06-03 @ 07:01  -  06-04 @ 07:00  --------------------------------------------------------  IN: 676.6 mL / OUT: 1100 mL / NET: -423.4 mL     @ 07:01  -   @ 14:48  --------------------------------------------------------  IN: 240 mL / OUT: 0 mL / NET: 240 mL      PHYSICAL EXAM:    General: elderly male, lying comfortably in bed; in no acute distress; wife at bedside  HEENT: MMM, conjunctiva and sclera clear  Gastrointestinal: Soft, non-tender non-distended; Normal bowel sounds; No rebound or guarding  Extremities: Normal range of motion, No clubbing, cyanosis or edema  Neurological: Alert and oriented x3  Skin: Warm and dry. No obvious rash    LABS:                        13.3   8.66  )-----------( 76       ( 2022 06:26 )             39.8     06-    138  |  105  |  14  ----------------------------<  87  See Note   |  23  |  1.22    Ca    8.2<L>      2022 06:26  Phos  2.5     -  Mg     2.1         TPro  5.6<L>  /  Alb  2.7<L>  /  TBili  2.0<H>  /  DBili  x   /  AST  See Note  /  ALT  See Note  /  AlkPhos  See Note            Urinalysis Basic - ( 2022 12:05 )    Color: Yellow / Appearance: Clear / S.015 / pH: x  Gluc: x / Ketone: 15 mg/dL  / Bili: Negative / Urobili: 0.2 E.U./dL   Blood: x / Protein: Trace mg/dL / Nitrite: NEGATIVE   Leuk Esterase: NEGATIVE / RBC: < 5 /HPF / WBC < 5 /HPF   Sq Epi: x / Non Sq Epi: 0-5 /HPF / Bacteria: Present /HPF                Urinalysis with Rflx Culture (collected 2022 12:05)    Culture - Blood (collected 2022 11:27)  Source: .Blood Blood  Preliminary Report (2022 12:01):    No growth at 1 day.    Culture - Blood (collected 2022 11:27)  Source: .Blood Blood  Preliminary Report (2022 12:01):    No growth at 1 day.    Urinalysis with Rflx Culture (collected 2022 19:55)      RADIOLOGY & ADDITIONAL STUDIES:  Reviewed

## 2022-06-04 NOTE — PROGRESS NOTE ADULT - ASSESSMENT
87M PMHx HLD, falls (s/p cards w/u negative), pericardial effusion 2 yr ago, no significant pshx presents w epigastric/substernal pain since 3pm, found to have elevated LTs with presence of stones on imaging, GI consulted for concern for choledocholithiasis and consideration of ERCP.    #Abnormal LTs  Patient presenting with epigastric pain starting 6/1, found to have abnormal LTs which continue to uptrend as of 6/2. CT A/P with findings c/w cholecystitis but normal CBD (6mm). With low grade temp and tachycardic on admission, VSS since admission. ddx includes choledocolithiasis   -Per [place] criteria, with Definite Grade 1 acute mild cholangitis which can be conservatively managed with empiric abxs and supportive care for now. No urgent role for ERCP at this time, will tentatively plan for ERCP tomorrow, 6/3  -c/w empiric abxs with Ceftriaxone/Flagyl  -Trend LTs with daily CMPs  -MRCP - no choledocolithiasis, + cholecystitis  -EUS (6/3) - Celiac area was seen first and was without adenopathy. Pancreatic body and tail were visualized from the gastric body and were without abnormalities. Head of the pancreas was visualized from the duodenum and was normal. CBD was without evidence of stones or sludge in it, small in diameter, 2 mm. PD was also normal 1.7 mm in diameter   -Plan for cholecystectomy as per primary team  -Remainder of supportive care as per primary team    #History of Hepatitis   Patient notes remote history of hepatitis many years ago after a trip to [place], possibly after eating oysters. Not clear on which type of hepatitis he had contracted then  -Hepatitis panel negative, non-immunized Hepatitis B status, recommend pursuit of Hep B vaccination series as an outpatient    Case discussed with Dr Crockett and primary team.     Thank you for allowing us to participate in the care of this patient.  GI will sign off. Please call back with any questions or concerns.     Judi Kirkland DO  Gastroenterology Fellow  Pager: [phone]     87M PMHx HLD, falls (s/p cards w/u negative), pericardial effusion 2 yr ago, no significant pshx presents w epigastric/substernal pain since 3pm, found to have elevated LTs with presence of stones on imaging, GI consulted for concern for choledocholithiasis and consideration of ERCP.    #Abnormal LTs  Patient presenting with epigastric pain starting 6/1, found to have abnormal LTs which continue to uptrend as of 6/2. CT A/P with findings c/w cholecystitis but normal CBD (6mm). With low grade temp and tachycardic on admission, VSS since admission. ddx includes choledocolithiasis   -Per [place] criteria, with Definite Grade 1 acute mild cholangitis which can be conservatively managed with empiric abxs and supportive care for now. EUS on 6/3 also confirming no e/o choledocolithiasis  -c/w abxs  -Trend LTs with daily CMPs  -MRCP - no choledocolithiasis, + cholecystitis  -EUS (6/3) - Celiac area was seen first and was without adenopathy. Pancreatic body and tail were visualized from the gastric body and were without abnormalities. Head of the pancreas was visualized from the duodenum and was normal. CBD was without evidence of stones or sludge in it, small in diameter, 2 mm. PD was also normal 1.7 mm in diameter   -Plan for cholecystectomy as per primary team  -Remainder of supportive care as per primary team    #History of Hepatitis   Patient notes remote history of hepatitis many years ago after a trip to [place], possibly after eating oysters. Not clear on which type of hepatitis he had contracted then  -Hepatitis panel negative, non-immunized Hepatitis B status, recommend pursuit of Hep B vaccination series as an outpatient    Case discussed with Dr Crockett and primary team.     Thank you for allowing us to participate in the care of this patient.  GI will sign off. Please call back with any questions or concerns.     Judi Kirkland DO  Gastroenterology Fellow  Pager: [phone]

## 2022-06-04 NOTE — PROGRESS NOTE ADULT - SUBJECTIVE AND OBJECTIVE BOX
SUBJECTIVE: Patient seen and examined bedside.    heparin   Injectable 5000 Unit(s) SubCutaneous every 8 hours  piperacillin/tazobactam IVPB.. 3.375 Gram(s) IV Intermittent every 6 hours  tamsulosin 0.4 milliGRAM(s) Oral at bedtime      Vital Signs Last 24 Hrs  T(C): 36.8 (2022 06:15), Max: 38.6 (2022 10:35)  T(F): 98.2 (2022 06:15), Max: 101.4 (2022 10:35)  HR: 63 (2022 06:15) (60 - 111)  BP: 109/55 (2022 06:15) (94/52 - 157/81)  BP(mean): --  RR: 18 (2022 06:15) (17 - 20)  SpO2: 98% (2022 06:15) (94% - 98%)  I&O's Detail    2022 07:01  -  2022 07:00  --------------------------------------------------------  IN:    IV PiggyBack: 200 mL    IV PiggyBack: 266.6 mL    Lactated Ringers: 210 mL  Total IN: 676.6 mL    OUT:    Incontinent per Condom Catheter (mL): 1100 mL    Oral Fluid: 0 mL  Total OUT: 1100 mL    Total NET: -423.4 mL          General: NAD, resting comfortably in bed  C/V: NSR  Pulm: Nonlabored breathing, no respiratory distress  Abd: soft, NT/ND.  Extrem: WWP, no edema, SCDs in place        LABS:                        13.3   8.66  )-----------( 76       ( 2022 06:26 )             39.8     06-04    138  |  105  |  14  ----------------------------<  87  See Note   |  23  |  1.22    Ca    8.2<L>      2022 06:26  Phos  2.5     06-04  Mg     2.1     06-04    TPro  5.6<L>  /  Alb  2.7<L>  /  TBili  2.0<H>  /  DBili  x   /  AST  See Note  /  ALT  See Note  /  AlkPhos  See Note        Urinalysis Basic - ( 2022 12:05 )    Color: Yellow / Appearance: Clear / S.015 / pH: x  Gluc: x / Ketone: 15 mg/dL  / Bili: Negative / Urobili: 0.2 E.U./dL   Blood: x / Protein: Trace mg/dL / Nitrite: NEGATIVE   Leuk Esterase: NEGATIVE / RBC: < 5 /HPF / WBC < 5 /HPF   Sq Epi: x / Non Sq Epi: 0-5 /HPF / Bacteria: Present /HPF        RADIOLOGY & ADDITIONAL STUDIES:   SUBJECTIVE: Patient seen and examined bedside. Pt reports feeling well this morning without acute complaints. Denies abdominal pain, nausea, or vomiting. Tolerating clear liquid diet. Aware of plan for surgery Monday.     heparin   Injectable 5000 Unit(s) SubCutaneous every 8 hours  piperacillin/tazobactam IVPB.. 3.375 Gram(s) IV Intermittent every 6 hours  tamsulosin 0.4 milliGRAM(s) Oral at bedtime      Vital Signs Last 24 Hrs  T(C): 36.8 (2022 06:15), Max: 38.6 (2022 10:35)  T(F): 98.2 (2022 06:15), Max: 101.4 (2022 10:35)  HR: 63 (2022 06:15) (60 - 111)  BP: 109/55 (2022 06:15) (94/52 - 157/81)  BP(mean): --  RR: 18 (2022 06:15) (17 - 20)  SpO2: 98% (2022 06:15) (94% - 98%)  I&O's Detail    2022 07:01  -  2022 07:00  --------------------------------------------------------  IN:    IV PiggyBack: 200 mL    IV PiggyBack: 266.6 mL    Lactated Ringers: 210 mL  Total IN: 676.6 mL    OUT:    Incontinent per Condom Catheter (mL): 1100 mL    Oral Fluid: 0 mL  Total OUT: 1100 mL    Total NET: -423.4 mL          General: NAD, resting comfortably in bed  C/V: NSR  Pulm: Nonlabored breathing, no respiratory distress  Abd: soft, NT/ND, no rebound, no guarding  Extrem: WWP, no edema, SCDs in place        LABS:                        13.3   8.66  )-----------( 76       ( 2022 06:26 )             39.8     06-04    138  |  105  |  14  ----------------------------<  87  See Note   |  23  |  1.22    Ca    8.2<L>      2022 06:26  Phos  2.5     06-  Mg     2.1     06-    TPro  5.6<L>  /  Alb  2.7<L>  /  TBili  2.0<H>  /  DBili  x   /  AST  See Note  /  ALT  See Note  /  AlkPhos  See Note        Urinalysis Basic - ( 2022 12:05 )    Color: Yellow / Appearance: Clear / S.015 / pH: x  Gluc: x / Ketone: 15 mg/dL  / Bili: Negative / Urobili: 0.2 E.U./dL   Blood: x / Protein: Trace mg/dL / Nitrite: NEGATIVE   Leuk Esterase: NEGATIVE / RBC: < 5 /HPF / WBC < 5 /HPF   Sq Epi: x / Non Sq Epi: 0-5 /HPF / Bacteria: Present /HPF        RADIOLOGY & ADDITIONAL STUDIES:

## 2022-06-05 LAB
ALBUMIN SERPL ELPH-MCNC: 2.9 G/DL — LOW (ref 3.3–5)
ALBUMIN SERPL ELPH-MCNC: 3.6 G/DL — SIGNIFICANT CHANGE UP (ref 3.3–5)
ALP SERPL-CCNC: 127 U/L — HIGH (ref 40–120)
ALP SERPL-CCNC: 98 U/L — SIGNIFICANT CHANGE UP (ref 40–120)
ALT FLD-CCNC: 119 U/L — HIGH (ref 10–45)
ALT FLD-CCNC: 165 U/L — HIGH (ref 10–45)
ANION GAP SERPL CALC-SCNC: 12 MMOL/L — SIGNIFICANT CHANGE UP (ref 5–17)
ANION GAP SERPL CALC-SCNC: 9 MMOL/L — SIGNIFICANT CHANGE UP (ref 5–17)
APTT BLD: 28 SEC — SIGNIFICANT CHANGE UP (ref 27.5–35.5)
AST SERPL-CCNC: 47 U/L — HIGH (ref 10–40)
AST SERPL-CCNC: 98 U/L — HIGH (ref 10–40)
BILIRUB DIRECT SERPL-MCNC: 0.9 MG/DL — HIGH (ref 0–0.3)
BILIRUB INDIRECT FLD-MCNC: 0.9 MG/DL — SIGNIFICANT CHANGE UP (ref 0.2–1)
BILIRUB SERPL-MCNC: 1.8 MG/DL — HIGH (ref 0.2–1.2)
BILIRUB SERPL-MCNC: 1.8 MG/DL — HIGH (ref 0.2–1.2)
BILIRUB SERPL-MCNC: 1.9 MG/DL — HIGH (ref 0.2–1.2)
BLD GP AB SCN SERPL QL: NEGATIVE — SIGNIFICANT CHANGE UP
BUN SERPL-MCNC: 8 MG/DL — SIGNIFICANT CHANGE UP (ref 7–23)
BUN SERPL-MCNC: 9 MG/DL — SIGNIFICANT CHANGE UP (ref 7–23)
CALCIUM SERPL-MCNC: 8 MG/DL — LOW (ref 8.4–10.5)
CALCIUM SERPL-MCNC: 8.6 MG/DL — SIGNIFICANT CHANGE UP (ref 8.4–10.5)
CHLORIDE SERPL-SCNC: 105 MMOL/L — SIGNIFICANT CHANGE UP (ref 96–108)
CHLORIDE SERPL-SCNC: 106 MMOL/L — SIGNIFICANT CHANGE UP (ref 96–108)
CO2 SERPL-SCNC: 22 MMOL/L — SIGNIFICANT CHANGE UP (ref 22–31)
CO2 SERPL-SCNC: 25 MMOL/L — SIGNIFICANT CHANGE UP (ref 22–31)
CREAT SERPL-MCNC: 1.05 MG/DL — SIGNIFICANT CHANGE UP (ref 0.5–1.3)
CREAT SERPL-MCNC: 1.17 MG/DL — SIGNIFICANT CHANGE UP (ref 0.5–1.3)
EGFR: 61 ML/MIN/1.73M2 — SIGNIFICANT CHANGE UP
EGFR: 70 ML/MIN/1.73M2 — SIGNIFICANT CHANGE UP
GLUCOSE SERPL-MCNC: 117 MG/DL — HIGH (ref 70–99)
GLUCOSE SERPL-MCNC: 125 MG/DL — HIGH (ref 70–99)
HCT VFR BLD CALC: 39.4 % — SIGNIFICANT CHANGE UP (ref 39–50)
HGB BLD-MCNC: 13.3 G/DL — SIGNIFICANT CHANGE UP (ref 13–17)
INR BLD: 0.96 — SIGNIFICANT CHANGE UP (ref 0.88–1.16)
MAGNESIUM SERPL-MCNC: 2.2 MG/DL — SIGNIFICANT CHANGE UP (ref 1.6–2.6)
MAGNESIUM SERPL-MCNC: 2.4 MG/DL — SIGNIFICANT CHANGE UP (ref 1.6–2.6)
MCHC RBC-ENTMCNC: 33.4 PG — SIGNIFICANT CHANGE UP (ref 27–34)
MCHC RBC-ENTMCNC: 33.8 GM/DL — SIGNIFICANT CHANGE UP (ref 32–36)
MCV RBC AUTO: 99 FL — SIGNIFICANT CHANGE UP (ref 80–100)
NRBC # BLD: 0 /100 WBCS — SIGNIFICANT CHANGE UP (ref 0–0)
PHOSPHATE SERPL-MCNC: 2 MG/DL — LOW (ref 2.5–4.5)
PHOSPHATE SERPL-MCNC: 2.6 MG/DL — SIGNIFICANT CHANGE UP (ref 2.5–4.5)
PLATELET # BLD AUTO: 101 K/UL — LOW (ref 150–400)
POTASSIUM SERPL-MCNC: 3.3 MMOL/L — LOW (ref 3.5–5.3)
POTASSIUM SERPL-MCNC: 3.8 MMOL/L — SIGNIFICANT CHANGE UP (ref 3.5–5.3)
POTASSIUM SERPL-SCNC: 3.3 MMOL/L — LOW (ref 3.5–5.3)
POTASSIUM SERPL-SCNC: 3.8 MMOL/L — SIGNIFICANT CHANGE UP (ref 3.5–5.3)
PROT SERPL-MCNC: 5.4 G/DL — LOW (ref 6–8.3)
PROT SERPL-MCNC: 6.9 G/DL — SIGNIFICANT CHANGE UP (ref 6–8.3)
PROTHROM AB SERPL-ACNC: 11.4 SEC — SIGNIFICANT CHANGE UP (ref 10.5–13.4)
RBC # BLD: 3.98 M/UL — LOW (ref 4.2–5.8)
RBC # FLD: 13.2 % — SIGNIFICANT CHANGE UP (ref 10.3–14.5)
RH IG SCN BLD-IMP: POSITIVE — SIGNIFICANT CHANGE UP
SODIUM SERPL-SCNC: 139 MMOL/L — SIGNIFICANT CHANGE UP (ref 135–145)
SODIUM SERPL-SCNC: 140 MMOL/L — SIGNIFICANT CHANGE UP (ref 135–145)
WBC # BLD: 6.65 K/UL — SIGNIFICANT CHANGE UP (ref 3.8–10.5)
WBC # FLD AUTO: 6.65 K/UL — SIGNIFICANT CHANGE UP (ref 3.8–10.5)

## 2022-06-05 DEVICE — LIGATING CLIPS WECK HEMOLOK POLYMER MEDIUM-LARGE (GREEN) 6: Type: IMPLANTABLE DEVICE | Status: FUNCTIONAL

## 2022-06-05 RX ORDER — POTASSIUM PHOSPHATE, MONOBASIC POTASSIUM PHOSPHATE, DIBASIC 236; 224 MG/ML; MG/ML
30 INJECTION, SOLUTION INTRAVENOUS ONCE
Refills: 0 | Status: COMPLETED | OUTPATIENT
Start: 2022-06-05 | End: 2022-06-05

## 2022-06-05 RX ORDER — OXYCODONE HYDROCHLORIDE 5 MG/1
5 TABLET ORAL EVERY 4 HOURS
Refills: 0 | Status: DISCONTINUED | OUTPATIENT
Start: 2022-06-05 | End: 2022-06-08

## 2022-06-05 RX ORDER — HYDROMORPHONE HYDROCHLORIDE 2 MG/ML
0.5 INJECTION INTRAMUSCULAR; INTRAVENOUS; SUBCUTANEOUS EVERY 6 HOURS
Refills: 0 | Status: DISCONTINUED | OUTPATIENT
Start: 2022-06-05 | End: 2022-06-06

## 2022-06-05 RX ORDER — HYDROMORPHONE HYDROCHLORIDE 2 MG/ML
0.5 INJECTION INTRAMUSCULAR; INTRAVENOUS; SUBCUTANEOUS ONCE
Refills: 0 | Status: DISCONTINUED | OUTPATIENT
Start: 2022-06-05 | End: 2022-06-05

## 2022-06-05 RX ORDER — OXYCODONE HYDROCHLORIDE 5 MG/1
10 TABLET ORAL EVERY 4 HOURS
Refills: 0 | Status: DISCONTINUED | OUTPATIENT
Start: 2022-06-05 | End: 2022-06-05

## 2022-06-05 RX ORDER — POTASSIUM PHOSPHATE, MONOBASIC POTASSIUM PHOSPHATE, DIBASIC 236; 224 MG/ML; MG/ML
15 INJECTION, SOLUTION INTRAVENOUS ONCE
Refills: 0 | Status: DISCONTINUED | OUTPATIENT
Start: 2022-06-05 | End: 2022-06-05

## 2022-06-05 RX ORDER — ACETAMINOPHEN 500 MG
650 TABLET ORAL EVERY 6 HOURS
Refills: 0 | Status: DISCONTINUED | OUTPATIENT
Start: 2022-06-05 | End: 2022-06-08

## 2022-06-05 RX ORDER — LABETALOL HCL 100 MG
10 TABLET ORAL ONCE
Refills: 0 | Status: COMPLETED | OUTPATIENT
Start: 2022-06-05 | End: 2022-06-05

## 2022-06-05 RX ORDER — ACETAMINOPHEN 500 MG
1000 TABLET ORAL ONCE
Refills: 0 | Status: COMPLETED | OUTPATIENT
Start: 2022-06-05 | End: 2022-06-05

## 2022-06-05 RX ADMIN — Medication 400 MILLIGRAM(S): at 15:57

## 2022-06-05 RX ADMIN — Medication 650 MILLIGRAM(S): at 22:38

## 2022-06-05 RX ADMIN — HYDROMORPHONE HYDROCHLORIDE 0.5 MILLIGRAM(S): 2 INJECTION INTRAMUSCULAR; INTRAVENOUS; SUBCUTANEOUS at 13:14

## 2022-06-05 RX ADMIN — PIPERACILLIN AND TAZOBACTAM 200 GRAM(S): 4; .5 INJECTION, POWDER, LYOPHILIZED, FOR SOLUTION INTRAVENOUS at 13:25

## 2022-06-05 RX ADMIN — TAMSULOSIN HYDROCHLORIDE 0.4 MILLIGRAM(S): 0.4 CAPSULE ORAL at 21:39

## 2022-06-05 RX ADMIN — PIPERACILLIN AND TAZOBACTAM 200 GRAM(S): 4; .5 INJECTION, POWDER, LYOPHILIZED, FOR SOLUTION INTRAVENOUS at 17:23

## 2022-06-05 RX ADMIN — PIPERACILLIN AND TAZOBACTAM 200 GRAM(S): 4; .5 INJECTION, POWDER, LYOPHILIZED, FOR SOLUTION INTRAVENOUS at 05:31

## 2022-06-05 RX ADMIN — POTASSIUM PHOSPHATE, MONOBASIC POTASSIUM PHOSPHATE, DIBASIC 83.33 MILLIMOLE(S): 236; 224 INJECTION, SOLUTION INTRAVENOUS at 13:05

## 2022-06-05 RX ADMIN — SIMVASTATIN 20 MILLIGRAM(S): 20 TABLET, FILM COATED ORAL at 21:39

## 2022-06-05 RX ADMIN — PIPERACILLIN AND TAZOBACTAM 200 GRAM(S): 4; .5 INJECTION, POWDER, LYOPHILIZED, FOR SOLUTION INTRAVENOUS at 00:13

## 2022-06-05 RX ADMIN — Medication 650 MILLIGRAM(S): at 21:38

## 2022-06-05 RX ADMIN — HEPARIN SODIUM 5000 UNIT(S): 5000 INJECTION INTRAVENOUS; SUBCUTANEOUS at 05:31

## 2022-06-05 RX ADMIN — Medication 10 MILLIGRAM(S): at 13:36

## 2022-06-05 RX ADMIN — Medication 1000 MILLIGRAM(S): at 16:25

## 2022-06-05 NOTE — PROGRESS NOTE ADULT - ASSESSMENT
87M pmh hld, falls (s/p cards w/u negative), pericardial effusion 2 yr ago, no significant pshx presents w epigastric/substernal pain a/w fever, nausea & decreased appetite. Febrile (100.5) and tachy (100s) upon ED presentation. Labs significant for TBil 1.7 (direct 0.6). US and CT (6/1) suspicious for acute cholecystitis s/p EUS no stones, normal CBD    NPO/IVF  Pain & nausea control prn  IVABx (Zosyn)  SCD/SQH  OOBA/IS  AM labs  Fall precautions  OR

## 2022-06-05 NOTE — BRIEF OPERATIVE NOTE - OPERATION/FINDINGS
Procedure: Robot Assisted Lap Cholecystectomy    Access via Joseph cutdown. GB identified inflammed but decompressed w/ surrouding ascites. Surrounding ommentum swept and overlying peritonium incised. Cystic duct and artery identified and isolated. Critical view of safety obtained. Cystic duct and artery clipped and divided. Spillage of sludge and numerous small stones. GB freed from cystic plate and placed in Endocatch bag. Surgical bed copiously irrigated w/ 4 L irrigation. Hemostasis ensured. 19Fr Duane drain placed beneath liver. Fascia closed w/ Maxon, skin w/ monocryl.

## 2022-06-05 NOTE — PROGRESS NOTE ADULT - SUBJECTIVE AND OBJECTIVE BOX
Surgery Post-Op Note    Pre-Op Dx:   Procedure: Robot-assisted cholecystectomy      Surgeon: Dr. Dickerson    Subjective: Patient complains of some abdominal cramping. Denies nausea or vomiting      Vital Signs Last 24 Hrs  T(C): 36.3 (05 Jun 2022 12:11), Max: 36.4 (04 Jun 2022 21:11)  T(F): 97.4 (05 Jun 2022 12:11), Max: 97.6 (04 Jun 2022 21:11)  HR: 59 (05 Jun 2022 13:58) (55 - 80)  BP: 146/72 (05 Jun 2022 13:58) (112/58 - 199/83)  BP(mean): 103 (05 Jun 2022 13:58) (101 - 123)  RR: 16 (05 Jun 2022 13:58) (12 - 21)  SpO2: 97% (05 Jun 2022 13:58) (94% - 98%)    Physical Exam:  General: NAD, resting comfortably in bed  Pulmonary: Nonlabored breathing, no respiratory distress  Cardiovascular: NSR  Abdominal: soft, mild distention, minimally tender 2/2 analgesia, no rebound or guarding  Extremities: WWP, normal strength  Neuro: A/O x 3, CNs II-XII grossly intact, no focal deficits, normal sensation  Pulses: palpable distal pulses      LABS:                        13.3   6.65  )-----------( 101      ( 05 Jun 2022 07:01 )             39.4     06-05    139  |  105  |  9   ----------------------------<  125<H>  3.8   |  22  |  1.05    Ca    8.6      05 Jun 2022 12:26  Phos  2.6     06-05  Mg     2.2     06-05    TPro  6.9  /  Alb  3.6  /  TBili  1.9<H>  /  DBili  x   /  AST  98<H>  /  ALT  165<H>  /  AlkPhos  127<H>  06-05    PT/INR - ( 05 Jun 2022 07:01 )   PT: 11.4 sec;   INR: 0.96          PTT - ( 05 Jun 2022 07:01 )  PTT:28.0 sec  CAPILLARY BLOOD GLUCOSE          LIVER FUNCTIONS - ( 05 Jun 2022 12:26 )  Alb: 3.6 g/dL / Pro: 6.9 g/dL / ALK PHOS: 127 U/L / ALT: 165 U/L / AST: 98 U/L / GGT: x           ABO Interpretation: O (06-05 @ 07:31)        Radiology and Additional Studies:    Assessment:87y Male s/p above procedure    Plan:  Pain/nausea control PRN  Home meds  Incentive spirometer/OOB/Ambulate  IVF, Diet: NPO  fu PACU labs- Bili  AM labs  TOV

## 2022-06-05 NOTE — PRE-OP CHECKLIST - WEIGHT IN KG
Last seen by PCP: 12/23/21  Next appointment: none  HYDROcodone-acetaminophen (NORCO) 7.5-325 MG per tablet- Last ordered: 1/3/22 #120 with 0 refills.     PDMP needs to be reviewed by Provider     Sent to provider to approve or deny    
74.8
74.4

## 2022-06-05 NOTE — PROGRESS NOTE ADULT - SUBJECTIVE AND OBJECTIVE BOX
SUBJECTIVE: No specific complaints. Pending OR this AM. Pain is controlled.      MEDICATIONS  (STANDING):  heparin   Injectable 5000 Unit(s) SubCutaneous every 8 hours  influenza  Vaccine (HIGH DOSE) 0.7 milliLiter(s) IntraMuscular once  lactated ringers. 1000 milliLiter(s) (110 mL/Hr) IV Continuous <Continuous>  piperacillin/tazobactam IVPB.. 3.375 Gram(s) IV Intermittent every 6 hours  simvastatin 20 milliGRAM(s) Oral at bedtime  tamsulosin 0.4 milliGRAM(s) Oral at bedtime    MEDICATIONS  (PRN):  ondansetron Injectable 4 milliGRAM(s) IV Push every 6 hours PRN Nausea and/or Vomiting      Vital Signs Last 24 Hrs  T(C): 36.1 (2022 04:34), Max: 36.4 (2022 08:23)  T(F): 96.9 (2022 04:34), Max: 97.6 (2022 08:23)  HR: 62 (2022 04:34) (55 - 80)  BP: 152/71 (2022 04:34) (112/58 - 152/71)  BP(mean): --  RR: 17 (2022 04:34) (17 - 18)  SpO2: 97% (2022 04:34) (96% - 98%)    Physical Exam:  General: NAD, resting comfortably in bed  Pulmonary: Nonlabored breathing, no respiratory distress  Cardiovascular: NSR  Abdominal: soft, NT/ND  Extremities: WWP, normal strength  Neuro: A/O x 3, CNs II-XII grossly intact, no focal deficits, normal motor/sensation  Pulses: palpable distal pulses    I&O's Summary    2022 07:01  -  2022 07:00  --------------------------------------------------------  IN: 1470 mL / OUT: 1300 mL / NET: 170 mL        LABS:                        13.3   6.65  )-----------( 101      ( 2022 07:01 )             39.4     06-05    140  |  106  |  8   ----------------------------<  117<H>  3.3<L>   |  25  |  1.17    Ca    8.0<L>      2022 07:01  Phos  2.0     06-05  Mg     2.4     06-05    TPro  5.4<L>  /  Alb  2.9<L>  /  TBili  1.8<H>  /  DBili  x   /  AST  47<H>  /  ALT  119<H>  /  AlkPhos  98  06-05    PT/INR - ( 2022 07:01 )   PT: 11.4 sec;   INR: 0.96          PTT - ( 2022 07:01 )  PTT:28.0 sec  Urinalysis Basic - ( 2022 12:05 )    Color: Yellow / Appearance: Clear / S.015 / pH: x  Gluc: x / Ketone: 15 mg/dL  / Bili: Negative / Urobili: 0.2 E.U./dL   Blood: x / Protein: Trace mg/dL / Nitrite: NEGATIVE   Leuk Esterase: NEGATIVE / RBC: < 5 /HPF / WBC < 5 /HPF   Sq Epi: x / Non Sq Epi: 0-5 /HPF / Bacteria: Present /HPF      CAPILLARY BLOOD GLUCOSE        LIVER FUNCTIONS - ( 2022 07:01 )  Alb: 2.9 g/dL / Pro: 5.4 g/dL / ALK PHOS: 98 U/L / ALT: 119 U/L / AST: 47 U/L / GGT: x             RADIOLOGY & ADDITIONAL STUDIES:

## 2022-06-06 LAB
ALBUMIN SERPL ELPH-MCNC: 2.9 G/DL — LOW (ref 3.3–5)
ALP SERPL-CCNC: 112 U/L — SIGNIFICANT CHANGE UP (ref 40–120)
ALT FLD-CCNC: 119 U/L — HIGH (ref 10–45)
ANION GAP SERPL CALC-SCNC: 8 MMOL/L — SIGNIFICANT CHANGE UP (ref 5–17)
AST SERPL-CCNC: 52 U/L — HIGH (ref 10–40)
BILIRUB SERPL-MCNC: 1.1 MG/DL — SIGNIFICANT CHANGE UP (ref 0.2–1.2)
BUN SERPL-MCNC: 9 MG/DL — SIGNIFICANT CHANGE UP (ref 7–23)
CALCIUM SERPL-MCNC: 8.3 MG/DL — LOW (ref 8.4–10.5)
CHLORIDE SERPL-SCNC: 104 MMOL/L — SIGNIFICANT CHANGE UP (ref 96–108)
CO2 SERPL-SCNC: 26 MMOL/L — SIGNIFICANT CHANGE UP (ref 22–31)
CREAT SERPL-MCNC: 1.05 MG/DL — SIGNIFICANT CHANGE UP (ref 0.5–1.3)
EGFR: 70 ML/MIN/1.73M2 — SIGNIFICANT CHANGE UP
GLUCOSE SERPL-MCNC: 158 MG/DL — HIGH (ref 70–99)
HCT VFR BLD CALC: 39.4 % — SIGNIFICANT CHANGE UP (ref 39–50)
HGB BLD-MCNC: 13.3 G/DL — SIGNIFICANT CHANGE UP (ref 13–17)
MAGNESIUM SERPL-MCNC: 1.9 MG/DL — SIGNIFICANT CHANGE UP (ref 1.6–2.6)
MCHC RBC-ENTMCNC: 32.4 PG — SIGNIFICANT CHANGE UP (ref 27–34)
MCHC RBC-ENTMCNC: 33.8 GM/DL — SIGNIFICANT CHANGE UP (ref 32–36)
MCV RBC AUTO: 96.1 FL — SIGNIFICANT CHANGE UP (ref 80–100)
NRBC # BLD: 0 /100 WBCS — SIGNIFICANT CHANGE UP (ref 0–0)
PHOSPHATE SERPL-MCNC: 2.7 MG/DL — SIGNIFICANT CHANGE UP (ref 2.5–4.5)
PLATELET # BLD AUTO: 116 K/UL — LOW (ref 150–400)
POTASSIUM SERPL-MCNC: 4.2 MMOL/L — SIGNIFICANT CHANGE UP (ref 3.5–5.3)
POTASSIUM SERPL-SCNC: 4.2 MMOL/L — SIGNIFICANT CHANGE UP (ref 3.5–5.3)
PROT SERPL-MCNC: 5.6 G/DL — LOW (ref 6–8.3)
RBC # BLD: 4.1 M/UL — LOW (ref 4.2–5.8)
RBC # FLD: 13 % — SIGNIFICANT CHANGE UP (ref 10.3–14.5)
SODIUM SERPL-SCNC: 138 MMOL/L — SIGNIFICANT CHANGE UP (ref 135–145)
WBC # BLD: 8.9 K/UL — SIGNIFICANT CHANGE UP (ref 3.8–10.5)
WBC # FLD AUTO: 8.9 K/UL — SIGNIFICANT CHANGE UP (ref 3.8–10.5)

## 2022-06-06 RX ORDER — HEPARIN SODIUM 5000 [USP'U]/ML
5000 INJECTION INTRAVENOUS; SUBCUTANEOUS EVERY 8 HOURS
Refills: 0 | Status: DISCONTINUED | OUTPATIENT
Start: 2022-06-06 | End: 2022-06-08

## 2022-06-06 RX ORDER — MAGNESIUM SULFATE 500 MG/ML
1 VIAL (ML) INJECTION ONCE
Refills: 0 | Status: COMPLETED | OUTPATIENT
Start: 2022-06-06 | End: 2022-06-06

## 2022-06-06 RX ADMIN — HEPARIN SODIUM 5000 UNIT(S): 5000 INJECTION INTRAVENOUS; SUBCUTANEOUS at 22:47

## 2022-06-06 RX ADMIN — Medication 650 MILLIGRAM(S): at 06:26

## 2022-06-06 RX ADMIN — Medication 650 MILLIGRAM(S): at 18:36

## 2022-06-06 RX ADMIN — PIPERACILLIN AND TAZOBACTAM 200 GRAM(S): 4; .5 INJECTION, POWDER, LYOPHILIZED, FOR SOLUTION INTRAVENOUS at 12:24

## 2022-06-06 RX ADMIN — HEPARIN SODIUM 5000 UNIT(S): 5000 INJECTION INTRAVENOUS; SUBCUTANEOUS at 14:59

## 2022-06-06 RX ADMIN — PIPERACILLIN AND TAZOBACTAM 200 GRAM(S): 4; .5 INJECTION, POWDER, LYOPHILIZED, FOR SOLUTION INTRAVENOUS at 00:06

## 2022-06-06 RX ADMIN — TAMSULOSIN HYDROCHLORIDE 0.4 MILLIGRAM(S): 0.4 CAPSULE ORAL at 22:46

## 2022-06-06 RX ADMIN — Medication 650 MILLIGRAM(S): at 12:50

## 2022-06-06 RX ADMIN — Medication 650 MILLIGRAM(S): at 12:37

## 2022-06-06 RX ADMIN — PIPERACILLIN AND TAZOBACTAM 200 GRAM(S): 4; .5 INJECTION, POWDER, LYOPHILIZED, FOR SOLUTION INTRAVENOUS at 18:38

## 2022-06-06 RX ADMIN — Medication 100 GRAM(S): at 07:59

## 2022-06-06 RX ADMIN — Medication 650 MILLIGRAM(S): at 19:05

## 2022-06-06 RX ADMIN — PIPERACILLIN AND TAZOBACTAM 200 GRAM(S): 4; .5 INJECTION, POWDER, LYOPHILIZED, FOR SOLUTION INTRAVENOUS at 05:26

## 2022-06-06 RX ADMIN — Medication 650 MILLIGRAM(S): at 05:26

## 2022-06-06 RX ADMIN — SIMVASTATIN 20 MILLIGRAM(S): 20 TABLET, FILM COATED ORAL at 22:46

## 2022-06-06 NOTE — PROGRESS NOTE ADULT - SUBJECTIVE AND OBJECTIVE BOX
SUBJECTIVE: Patient seen and examined bedside. Pt reports feeling well this morning. Denies pain, nausea or vomiting. Tolerating CLD. Denies flatus or BM since surgery    piperacillin/tazobactam IVPB.. 3.375 Gram(s) IV Intermittent every 6 hours  tamsulosin 0.4 milliGRAM(s) Oral at bedtime      Vital Signs Last 24 Hrs  T(C): 36.6 (06 Jun 2022 05:25), Max: 36.8 (05 Jun 2022 16:22)  T(F): 97.9 (06 Jun 2022 05:25), Max: 98.3 (05 Jun 2022 16:22)  HR: 62 (06 Jun 2022 05:25) (56 - 79)  BP: 167/77 (06 Jun 2022 05:25) (146/72 - 199/83)  BP(mean): 109 (05 Jun 2022 15:10) (101 - 123)  RR: 17 (06 Jun 2022 05:25) (12 - 21)  SpO2: 95% (06 Jun 2022 05:25) (94% - 98%)  I&O's Detail    05 Jun 2022 07:01  -  06 Jun 2022 07:00  --------------------------------------------------------  IN:    IV PiggyBack: 100 mL    IV PiggyBack: 200 mL    IV PiggyBack: 166.6 mL    Lactated Ringers: 1100 mL    Other (mL): 1200 mL  Total IN: 2766.6 mL    OUT:    Blood Loss (mL): 30 mL    Bulb (mL): 88 mL    Voided (mL): 940 mL  Total OUT: 1058 mL    Total NET: 1708.6 mL          General: NAD, resting comfortably in bed  C/V: NSR  Pulm: Nonlabored breathing, no respiratory distress  Abd: soft, mildly distended, appropriate incisional TTP, incisions CDI, no rebound, no guarding  Extrem: WWP, no edema, SCDs in place        LABS:                        13.3   8.90  )-----------( 116      ( 06 Jun 2022 05:30 )             39.4     06-06    138  |  104  |  9   ----------------------------<  158<H>  4.2   |  26  |  1.05    Ca    8.3<L>      06 Jun 2022 05:30  Phos  2.7     06-06  Mg     1.9     06-06    TPro  5.6<L>  /  Alb  2.9<L>  /  TBili  1.1  /  DBili  x   /  AST  52<H>  /  ALT  119<H>  /  AlkPhos  112  06-06    PT/INR - ( 05 Jun 2022 07:01 )   PT: 11.4 sec;   INR: 0.96          PTT - ( 05 Jun 2022 07:01 )  PTT:28.0 sec      RADIOLOGY & ADDITIONAL STUDIES:

## 2022-06-06 NOTE — OCCUPATIONAL THERAPY INITIAL EVALUATION ADULT - ADDITIONAL COMMENTS
Pt states that he lives w/ his wife in apt w/ elevator access. Pt states that he was independent prior to admission. No AEs/DMEs reported by Pt.

## 2022-06-06 NOTE — DISCHARGE NOTE PROVIDER - NSFTFHOMEHTHYNRD_GEN_ALL_CORE
Patient would like a call back from medical staff to discuss hip pain that she is having. Please call Valentina at [phone]..   Yes

## 2022-06-06 NOTE — PROGRESS NOTE ADULT - SUBJECTIVE AND OBJECTIVE BOX
INTERVAL HPI/OVERNIGHT EVENTS:   SURGERY ATTENDING    STATUS POST:  Robotic cholecystectomy, LEA, Evacuation of right upper quadrant abscess, washout , drainage.    POST OPERATIVE DAY #: 1    SUBJECTIVE:  Flatus: [ ] YES [x ] NO             Bowel Movement: [ ] YES [x ] NO  Pain (0-10):        3    Pain Control Adequate: [x ] YES [ ] NO  Nausea: [ ] YES [x ] NO            Vomiting: [ ] YES [x ] NO  Diarrhea: [ ] YES [x ] NO         Constipation: [ ] YES [x ] NO     Chest Pain: [ ] YES [x ] NO    SOB:  [ ] YES [x NO    MEDICATIONS  (STANDING):  heparin   Injectable 5000 Unit(s) SubCutaneous every 8 hours  influenza  Vaccine (HIGH DOSE) 0.7 milliLiter(s) IntraMuscular once  piperacillin/tazobactam IVPB.. 3.375 Gram(s) IV Intermittent every 6 hours  simvastatin 20 milliGRAM(s) Oral at bedtime  tamsulosin 0.4 milliGRAM(s) Oral at bedtime    MEDICATIONS  (PRN):  acetaminophen     Tablet .. 650 milliGRAM(s) Oral every 6 hours PRN Mild Pain (1 - 3), Moderate Pain (4 - 6)  HYDROmorphone  Injectable 0.5 milliGRAM(s) IV Push every 6 hours PRN Breakthrough  ondansetron Injectable 4 milliGRAM(s) IV Push every 6 hours PRN Nausea and/or Vomiting  oxyCODONE    IR 5 milliGRAM(s) Oral every 4 hours PRN Severe Pain (7 - 10)      Vital Signs Last 24 Hrs  T(C): 36.6 (06 Jun 2022 05:25), Max: 36.8 (05 Jun 2022 16:22)  T(F): 97.9 (06 Jun 2022 05:25), Max: 98.3 (05 Jun 2022 16:22)  HR: 62 (06 Jun 2022 05:25) (56 - 79)  BP: 167/77 (06 Jun 2022 05:25) (146/72 - 199/83)  BP(mean): 109 (05 Jun 2022 15:10) (101 - 123)  RR: 17 (06 Jun 2022 05:25) (12 - 21)  SpO2: 95% (06 Jun 2022 05:25) (94% - 98%)    PHYSICAL EXAM:      Constitutional:    Eyes:    ENMT:    Neck:    Breasts:    Back:    Respiratory:    Cardiovascular:    Gastrointestinal:    Genitourinary:    Rectal:    Extremities:    Vascular:    Neurological:    Skin:    Lymph Nodes:    Musculoskeletal:    Psychiatric:        I&O's Detail    05 Jun 2022 07:01  -  06 Jun 2022 07:00  --------------------------------------------------------  IN:    IV PiggyBack: 100 mL    IV PiggyBack: 200 mL    IV PiggyBack: 166.6 mL    Lactated Ringers: 1100 mL    Other (mL): 1200 mL  Total IN: 2766.6 mL    OUT:    Blood Loss (mL): 30 mL    Bulb (mL): 88 mL    Voided (mL): 940 mL  Total OUT: 1058 mL    Total NET: 1708.6 mL          LABS:                        13.3   8.90  )-----------( 116      ( 06 Jun 2022 05:30 )             39.4     06-06    138  |  104  |  9   ----------------------------<  158<H>  4.2   |  26  |  1.05    Ca    8.3<L>      06 Jun 2022 05:30  Phos  2.7     06-06  Mg     1.9     06-06    TPro  5.6<L>  /  Alb  2.9<L>  /  TBili  1.1  /  DBili  x   /  AST  52<H>  /  ALT  119<H>  /  AlkPhos  112  06-06    PT/INR - ( 05 Jun 2022 07:01 )   PT: 11.4 sec;   INR: 0.96          PTT - ( 05 Jun 2022 07:01 )  PTT:28.0 sec      RADIOLOGY & ADDITIONAL STUDIES:

## 2022-06-06 NOTE — PHYSICAL THERAPY INITIAL EVALUATION ADULT - PERTINENT HX OF CURRENT PROBLEM, REHAB EVAL
Pt is an 88 yo male presents w epigastric/substernal pain a/w fever, nausea & decreased appetite. Febrile (100.5) and tachy (100s) upon ED presentation. Labs significant for TBil 1.7 (direct 0.6). US and CT (6/1) suspicious for acute cholecystitis s/p EUS no stones, normal CBD. Now s/p robotic assited cholecystectomy with RIVER placement (6/5)

## 2022-06-06 NOTE — DISCHARGE NOTE PROVIDER - PROVIDER TOKENS
PROVIDER:[TOKEN:[[id number]]] PROVIDER:[TOKEN:[[id number]]],PROVIDER:[TOKEN:[[id number]]]

## 2022-06-06 NOTE — DISCHARGE NOTE PROVIDER - CARE PROVIDER_API CALL
Neeru Dickerson (MD)  Surgery  [address], [address]  [place], [place] [address]  Phone: [phone]  Fax: [phone]  Follow Up Time:    Neeru Dickerson)  Surgery  [address], [address]  [place] [address]  Phone: [phone]  Fax: [phone]  Follow Up Time:     Paulino Naidu)  Cardiovascular Disease; Interventional Cardiology  [address] [facility], [address]  [place] [address]  Phone: [phone]  Fax: [phone]  Follow Up Time:

## 2022-06-06 NOTE — OCCUPATIONAL THERAPY INITIAL EVALUATION ADULT - MANUAL MUSCLE TESTING RESULTS, REHAB EVAL
BP has improved since starting amlodipine. Advised to continue all current meds and follow up in Feb for annual as previously scheduled.TW
BUEs 5/5

## 2022-06-06 NOTE — PHYSICAL THERAPY INITIAL EVALUATION ADULT - GAIT DEVIATIONS NOTED, PT EVAL
slightly unsteady gait, no lose of balance, decreased heel strike and hip flexion bilaterally, increased lateral sway./decreased rola/increased time in double stance/decreased step length

## 2022-06-06 NOTE — DISCHARGE NOTE PROVIDER - NSDCMRMEDTOKEN_GEN_ALL_CORE_FT
Flomax 0.4 mg oral capsule: 1 cap(s) orally once a day  simvastatin 20 mg oral tablet: 1 tab(s) orally once a day (at bedtime)   acetaminophen 325 mg oral tablet: 2 tab(s) orally every 6 hours, As needed, Mild Pain (1 - 3), Moderate Pain (4 - 6)  Flomax 0.4 mg oral capsule: 1 cap(s) orally once a day  oxyCODONE 5 mg oral tablet: 1 tab(s) orally every 4 hours, As needed, Severe Pain (7 - 10)  simvastatin 20 mg oral tablet: 1 tab(s) orally once a day (at bedtime)

## 2022-06-06 NOTE — DISCHARGE NOTE PROVIDER - NSDCFUADDINST_GEN_ALL_CORE_FT
Please follow up with Dr. Dickerson next week. Call his office to schedule an appointment: [phone].  Use ice packs on incisions 10 mins on 10 mins off. Drink at least 2L of water in the first 24 hours. Remain on liquid diet until bowel movement, then you may have regular diet. Call the office if you experience increasing pain, nausea, vomiting, swelling, redness, or drainage from incision site, temperature >101.4F.   You have been prescribed oral antibiotics. Please be sure to complete the entire course as directed.  You may take tylenol every 6 hours as needed for pain, do not exceed 4,000mg of tylenol in 24 hours.   Please follow up with Dr. Dickerson next week. Call his office to schedule an appointment: [phone].  Use ice packs on incisions 10 mins on 10 mins off. Drink at least 2L of water in the first 24 hours. Remain on liquid diet until bowel movement, then you may have regular diet. Call the office if you experience increasing pain, nausea, vomiting, swelling, redness, or drainage from incision site, temperature >101.4F.   You have been prescribed oral antibiotics. Please be sure to complete the entire course as directed.  You may take tylenol every 6 hours as needed for pain, do not exceed 4,000mg of tylenol in 24 hours. Please follow up with Dr. Dickerson next week. Call his office to schedule an appointment: [phone].  Use ice packs on incisions 10 mins on 10 mins off. Drink at least 2L of water in the first 24 hours. Call the office if you experience increasing pain, nausea, vomiting, swelling, redness, or drainage from incision site, temperature >101.4F. No further antibiotics needed on discharge.  You may take tylenol every 6 hours as needed for pain, do not exceed 4,000mg of tylenol in 24 hours. May continue oxycodone 5mg 1 tablet every 4 hours as needed for severe pain. At [facility]: Start metoprolol succinate (toprol) 50mg once daily until follow up appt with cardiology.

## 2022-06-06 NOTE — PHYSICAL THERAPY INITIAL EVALUATION ADULT - IMPAIRED TRANSFERS: SIT/STAND, REHAB EVAL
requires VCs from PT for proper hands placement/impaired balance/pain/impaired postural control/decreased strength

## 2022-06-06 NOTE — DISCHARGE NOTE PROVIDER - HOSPITAL COURSE
87M pmh hld, falls (s/p cards w/u negative), pericardial effusion 2 yr ago, no significant pshx presented w epigastric/substernal pain a/w fever, nausea & decreased appetite. Febrile (100.5) and tachy (100s) upon ED presentation. Labs significant for TBil 1.7 (direct 0.6). US and CT (6/1) suspicious for acute cholecystitis. On 6/3 pt underwent EUS which showed no stones, normal CBD. On 6/5 pt underwent robotic assited cholecystectomy with RIVER placement. His postoperative course was unremarkable with advancement of diet, passing trial of void, and pain control. On day of discharge patient was stable to be d/c'd home. 87M pmh hld, falls (s/p cards w/u negative), pericardial effusion 2 yr ago, no significant pshx presented w epigastric/substernal pain a/w fever, nausea & decreased appetite. Febrile (100.5) and tachy (100s) upon ED presentation. Labs significant for TBil 1.7 (direct 0.6). US and CT (6/1) suspicious for acute cholecystitis. On 6/3 pt underwent EUS which showed no stones, normal CBD. On 6/5 pt underwent robotic assisted cholecystectomy with RIVER placement. His postoperative course was unremarkable with advancement of diet, passing trial of void, and pain control. On day of discharge patient was stable to be d/c'd home.    6/8: Mayur diet, -N/-V. +F/-BM. pt axmatic, repeat SBP 160s. accepted to [facility], awaiting bed  ON: Mayur diet, [id number] (labetalol 10mg x1)  6/7: benadryl 25mg PO x 1 for eye itching. +BM. Adv to soft low fat diet. Eye itching resolved. unsure if flatus, +BM. RIVER SS OP. GI reconsulted for high Tbili, fractionated values do not signify obstruction. SBP 180s, pt asxmatic, self resolved to SBP 160s.  ON: SBP 170s (IV hydralazine 10mg x1), pain controlled, -n/v  6/6: SQH restarted, PT recs [facility] and OT,   ON: mod pain (controlled by Tylenol), -n/v, -f/-bm, AVSS  6/5: OR for RA alfonzo, some stone spill, HTN in PACU, treated pain, labetalol x1, Bili elevated 1.9, remains NPO, POC wnl, repleted pacu labs, CLD per Dronsky, PTOV  O/N: added on as class 3 RA lap alfonzo, COVID negative, VSS  6/4: preopped,   ON: Pain resolved, mayur CLD, AVSS  6/3: temp to 101, blood cx drawn, CXR wnl, UA neg, EUS no stones, normal CBD, HLIV  ON: MRCP wet read cholelithiasis & cholecystitis w/o choledocho, JUNE, AVSS  6/2: MRCP pending, switched to zosyn  ON: Admitted for acute cholecystitis, pain & nausea controlled, preop'ed for OR class 3   87M pmh hld, falls (s/p cards w/u negative), pericardial effusion 2 yr ago, no significant pshx presented w epigastric/substernal pain a/w fever, nausea & decreased appetite. Febrile (100.5) and tachy (100s) upon ED presentation. Labs significant for TBil 1.7 (direct 0.6). US and CT (6/1) suspicious for acute cholecystitis. On 6/3 pt underwent EUS which showed no stones, normal CBD. On 6/5 pt underwent robotic assisted cholecystectomy with RIVER placement. His postoperative course was unremarkable with advancement of diet, passing trial of void, and pain control. Physical therapy evaluated the pt and recommended subacute rehab. GI reconsulted for post op elevated tbili, no acute intervention, no concern for biliary obstruction, tbili downtrending. RIVER drain removed on day of discharge. At time of discharge pt is tolerating diet, pain well controlled, pt is ambulating/voiding freely, having adequate bowel function. Pt is HD stable and medically ready for discharge.     87M pmh hld, falls (s/p cards w/u negative), pericardial effusion 2 yr ago, no significant pshx presented w epigastric/substernal pain a/w fever, nausea & decreased appetite. Febrile (100.5) and tachy (100s) upon ED presentation. Labs significant for TBil 1.7 (direct 0.6). US and CT (6/1) suspicious for acute cholecystitis. On 6/3 pt underwent EUS which showed no stones, normal CBD. On 6/5 pt underwent robotic assisted cholecystectomy with RIVER placement. His postoperative course was unremarkable with advancement of diet, passing trial of void, and pain control. Physical therapy evaluated the pt and recommended subacute rehab. GI reconsulted for post op elevated tbili, no acute intervention, no concern for biliary obstruction, tbili downtrending. RIVER drain removed on day of discharge. At time of discharge pt is tolerating diet, pain well controlled, pt is ambulating/voiding freely, having adequate bowel function. Pt is HD stable and medically ready for discharge to subacute rehab.

## 2022-06-06 NOTE — DISCHARGE NOTE PROVIDER - NSDCCPCAREPLAN_GEN_ALL_CORE_FT
Patient presents today for resting echo ordered by MD.   Echo Tech provided patient education.    Echo technician completed resting echo. Data transferred to [facility] for final interpretation through [employer].   Patient education provided about cardiology interpretation and primary provider will be notified of results.    Priti Light RDCS   PRINCIPAL DISCHARGE DIAGNOSIS  Diagnosis: Acute cholecystitis  Assessment and Plan of Treatment:

## 2022-06-06 NOTE — PHYSICAL THERAPY INITIAL EVALUATION ADULT - ADDITIONAL COMMENTS
Pt lives with spouse in an apt with elevator. Prior to admission, pt ambulated independently without AD. Pt admits 1 fall in last year September outdoor.

## 2022-06-06 NOTE — PROGRESS NOTE ADULT - ASSESSMENT
87M pmh hld, falls (s/p cards w/u negative), pericardial effusion 2 yr ago, no significant pshx presents w epigastric/substernal pain a/w fever, nausea & decreased appetite. Febrile (100.5) and tachy (100s) upon ED presentation. Labs significant for TBil 1.7 (direct 0.6). US and CT (6/1) suspicious for acute cholecystitis s/p EUS no stones, normal CBD. Now s/p robotic assited cholecystectomy with RIVER placement (6/5)    CLD  Pain & nausea control prn  IVABx (Zosyn)  SCD/SQH  OOBA/IS  AM labs  RIVER  Fall precautions

## 2022-06-06 NOTE — OCCUPATIONAL THERAPY INITIAL EVALUATION ADULT - GENERAL OBSERVATIONS, REHAB EVAL
Pt's RN Ras aware of intent to eval/cleared Pt. Pt received in supine - +telemetry, RIVER drain, heplock, [place], bed alarm, b/l scds. Pt w/ good affect, agreeable to OT.

## 2022-06-06 NOTE — PHYSICAL THERAPY INITIAL EVALUATION ADULT - GENERAL OBSERVATIONS, REHAB EVAL
Pt received semi supine in bed with +EKG, +hep-lock, +RIEVR drain, +b/l SCDs, +[place], abd incision C/D/I, NAD. Pt left as found, NAD, drain intact, call bell in reach, +bed alarm, [place](dislodged and JAZMINE Mcginnis is notified)

## 2022-06-07 LAB
ALBUMIN SERPL ELPH-MCNC: 3.2 G/DL — LOW (ref 3.3–5)
ALP SERPL-CCNC: 116 U/L — SIGNIFICANT CHANGE UP (ref 40–120)
ALT FLD-CCNC: 108 U/L — HIGH (ref 10–45)
ANION GAP SERPL CALC-SCNC: 9 MMOL/L — SIGNIFICANT CHANGE UP (ref 5–17)
AST SERPL-CCNC: 50 U/L — HIGH (ref 10–40)
BILIRUB DIRECT SERPL-MCNC: 0.3 MG/DL — SIGNIFICANT CHANGE UP (ref 0–0.3)
BILIRUB INDIRECT FLD-MCNC: 0.9 MG/DL — SIGNIFICANT CHANGE UP (ref 0.2–1)
BILIRUB SERPL-MCNC: 1.2 MG/DL — SIGNIFICANT CHANGE UP (ref 0.2–1.2)
BILIRUB SERPL-MCNC: 1.4 MG/DL — HIGH (ref 0.2–1.2)
BUN SERPL-MCNC: 8 MG/DL — SIGNIFICANT CHANGE UP (ref 7–23)
CALCIUM SERPL-MCNC: 8.6 MG/DL — SIGNIFICANT CHANGE UP (ref 8.4–10.5)
CHLORIDE SERPL-SCNC: 105 MMOL/L — SIGNIFICANT CHANGE UP (ref 96–108)
CO2 SERPL-SCNC: 24 MMOL/L — SIGNIFICANT CHANGE UP (ref 22–31)
CREAT SERPL-MCNC: 1.03 MG/DL — SIGNIFICANT CHANGE UP (ref 0.5–1.3)
EGFR: 71 ML/MIN/1.73M2 — SIGNIFICANT CHANGE UP
GLUCOSE SERPL-MCNC: 92 MG/DL — SIGNIFICANT CHANGE UP (ref 70–99)
HCT VFR BLD CALC: 43.1 % — SIGNIFICANT CHANGE UP (ref 39–50)
HGB BLD-MCNC: 15 G/DL — SIGNIFICANT CHANGE UP (ref 13–17)
MAGNESIUM SERPL-MCNC: 1.9 MG/DL — SIGNIFICANT CHANGE UP (ref 1.6–2.6)
MCHC RBC-ENTMCNC: 33 PG — SIGNIFICANT CHANGE UP (ref 27–34)
MCHC RBC-ENTMCNC: 34.8 GM/DL — SIGNIFICANT CHANGE UP (ref 32–36)
MCV RBC AUTO: 94.7 FL — SIGNIFICANT CHANGE UP (ref 80–100)
NRBC # BLD: 0 /100 WBCS — SIGNIFICANT CHANGE UP (ref 0–0)
PHOSPHATE SERPL-MCNC: 1.7 MG/DL — LOW (ref 2.5–4.5)
PLATELET # BLD AUTO: 146 K/UL — LOW (ref 150–400)
POTASSIUM SERPL-MCNC: 3.6 MMOL/L — SIGNIFICANT CHANGE UP (ref 3.5–5.3)
POTASSIUM SERPL-SCNC: 3.6 MMOL/L — SIGNIFICANT CHANGE UP (ref 3.5–5.3)
PROT SERPL-MCNC: 6.5 G/DL — SIGNIFICANT CHANGE UP (ref 6–8.3)
RBC # BLD: 4.55 M/UL — SIGNIFICANT CHANGE UP (ref 4.2–5.8)
RBC # FLD: 13.2 % — SIGNIFICANT CHANGE UP (ref 10.3–14.5)
SODIUM SERPL-SCNC: 138 MMOL/L — SIGNIFICANT CHANGE UP (ref 135–145)
WBC # BLD: 9.56 K/UL — SIGNIFICANT CHANGE UP (ref 3.8–10.5)
WBC # FLD AUTO: 9.56 K/UL — SIGNIFICANT CHANGE UP (ref 3.8–10.5)

## 2022-06-07 RX ORDER — POTASSIUM PHOSPHATE, MONOBASIC POTASSIUM PHOSPHATE, DIBASIC 236; 224 MG/ML; MG/ML
21 INJECTION, SOLUTION INTRAVENOUS ONCE
Refills: 0 | Status: COMPLETED | OUTPATIENT
Start: 2022-06-07 | End: 2022-06-07

## 2022-06-07 RX ORDER — DIPHENHYDRAMINE HCL 50 MG
25 CAPSULE ORAL ONCE
Refills: 0 | Status: COMPLETED | OUTPATIENT
Start: 2022-06-07 | End: 2022-06-07

## 2022-06-07 RX ORDER — HYDRALAZINE HCL 50 MG
10 TABLET ORAL ONCE
Refills: 0 | Status: COMPLETED | OUTPATIENT
Start: 2022-06-07 | End: 2022-06-07

## 2022-06-07 RX ADMIN — POTASSIUM PHOSPHATE, MONOBASIC POTASSIUM PHOSPHATE, DIBASIC 62.5 MILLIMOLE(S): 236; 224 INJECTION, SOLUTION INTRAVENOUS at 09:54

## 2022-06-07 RX ADMIN — Medication 650 MILLIGRAM(S): at 07:26

## 2022-06-07 RX ADMIN — Medication 25 MILLIGRAM(S): at 08:55

## 2022-06-07 RX ADMIN — Medication 650 MILLIGRAM(S): at 21:46

## 2022-06-07 RX ADMIN — Medication 10 MILLIGRAM(S): at 01:42

## 2022-06-07 RX ADMIN — Medication 650 MILLIGRAM(S): at 22:40

## 2022-06-07 RX ADMIN — TAMSULOSIN HYDROCHLORIDE 0.4 MILLIGRAM(S): 0.4 CAPSULE ORAL at 21:47

## 2022-06-07 RX ADMIN — PIPERACILLIN AND TAZOBACTAM 200 GRAM(S): 4; .5 INJECTION, POWDER, LYOPHILIZED, FOR SOLUTION INTRAVENOUS at 00:48

## 2022-06-07 RX ADMIN — Medication 650 MILLIGRAM(S): at 00:48

## 2022-06-07 RX ADMIN — PIPERACILLIN AND TAZOBACTAM 200 GRAM(S): 4; .5 INJECTION, POWDER, LYOPHILIZED, FOR SOLUTION INTRAVENOUS at 06:26

## 2022-06-07 RX ADMIN — PIPERACILLIN AND TAZOBACTAM 200 GRAM(S): 4; .5 INJECTION, POWDER, LYOPHILIZED, FOR SOLUTION INTRAVENOUS at 17:09

## 2022-06-07 RX ADMIN — Medication 650 MILLIGRAM(S): at 06:26

## 2022-06-07 RX ADMIN — HEPARIN SODIUM 5000 UNIT(S): 5000 INJECTION INTRAVENOUS; SUBCUTANEOUS at 13:45

## 2022-06-07 RX ADMIN — HEPARIN SODIUM 5000 UNIT(S): 5000 INJECTION INTRAVENOUS; SUBCUTANEOUS at 21:47

## 2022-06-07 RX ADMIN — Medication 650 MILLIGRAM(S): at 01:48

## 2022-06-07 RX ADMIN — HEPARIN SODIUM 5000 UNIT(S): 5000 INJECTION INTRAVENOUS; SUBCUTANEOUS at 06:25

## 2022-06-07 RX ADMIN — PIPERACILLIN AND TAZOBACTAM 200 GRAM(S): 4; .5 INJECTION, POWDER, LYOPHILIZED, FOR SOLUTION INTRAVENOUS at 11:12

## 2022-06-07 RX ADMIN — SIMVASTATIN 20 MILLIGRAM(S): 20 TABLET, FILM COATED ORAL at 21:47

## 2022-06-07 NOTE — PROGRESS NOTE ADULT - SUBJECTIVE AND OBJECTIVE BOX
SUBJECTIVE: Patient seen and examined bedside. Pt reports feeling well this morning. Denies pain, nausea or vomiting. Tolerating CLD diet. Passing flatus and having BMs.     heparin   Injectable 5000 Unit(s) SubCutaneous every 8 hours  piperacillin/tazobactam IVPB.. 3.375 Gram(s) IV Intermittent every 6 hours  tamsulosin 0.4 milliGRAM(s) Oral at bedtime      Vital Signs Last 24 Hrs  T(C): 36.7 (07 Jun 2022 00:48), Max: 36.9 (06 Jun 2022 09:00)  T(F): 98.1 (07 Jun 2022 00:48), Max: 98.4 (06 Jun 2022 09:00)  HR: 61 (07 Jun 2022 00:48) (57 - 70)  BP: 178/82 (07 Jun 2022 00:48) (143/65 - 178/82)  BP(mean): --  RR: 20 (07 Jun 2022 00:48) (14 - 20)  SpO2: 98% (07 Jun 2022 00:48) (95% - 98%)  I&O's Detail    05 Jun 2022 07:01  -  06 Jun 2022 07:00  --------------------------------------------------------  IN:    IV PiggyBack: 166.6 mL    IV PiggyBack: 100 mL    IV PiggyBack: 200 mL    Lactated Ringers: 1100 mL    Other (mL): 1200 mL  Total IN: 2766.6 mL    OUT:    Blood Loss (mL): 30 mL    Bulb (mL): 88 mL    Voided (mL): 940 mL  Total OUT: 1058 mL    Total NET: 1708.6 mL      06 Jun 2022 07:01  -  07 Jun 2022 06:55  --------------------------------------------------------  IN:    IV PiggyBack: 200 mL  Total IN: 200 mL    OUT:    Bulb (mL): 50 mL    Incontinent per Condom Catheter (mL): 302 mL    Voided (mL): 1075 mL  Total OUT: 1427 mL    Total NET: -1227 mL          General: NAD, resting comfortably in bed  C/V: NSR  Pulm: Nonlabored breathing, no respiratory distress  Abd: soft, nondistended, appropriate incisional TTP, incisions CDI, no rebound, no guarding  Extrem: WWP, no edema, SCDs in place        LABS:                        13.3   8.90  )-----------( 116      ( 06 Jun 2022 05:30 )             39.4     06-06    138  |  104  |  9   ----------------------------<  158<H>  4.2   |  26  |  1.05    Ca    8.3<L>      06 Jun 2022 05:30  Phos  2.7     06-06  Mg     1.9     06-06    TPro  5.6<L>  /  Alb  2.9<L>  /  TBili  1.1  /  DBili  x   /  AST  52<H>  /  ALT  119<H>  /  AlkPhos  112  06-06    PT/INR - ( 05 Jun 2022 07:01 )   PT: 11.4 sec;   INR: 0.96          PTT - ( 05 Jun 2022 07:01 )  PTT:28.0 sec      RADIOLOGY & ADDITIONAL STUDIES:

## 2022-06-07 NOTE — DIETITIAN INITIAL EVALUATION ADULT - PERTINENT MEDS FT
MEDICATIONS  (STANDING):  heparin   Injectable 5000 Unit(s) SubCutaneous every 8 hours  influenza  Vaccine (HIGH DOSE) 0.7 milliLiter(s) IntraMuscular once  piperacillin/tazobactam IVPB.. 3.375 Gram(s) IV Intermittent every 6 hours  simvastatin 20 milliGRAM(s) Oral at bedtime  tamsulosin 0.4 milliGRAM(s) Oral at bedtime    MEDICATIONS  (PRN):  acetaminophen     Tablet .. 650 milliGRAM(s) Oral every 6 hours PRN Mild Pain (1 - 3), Moderate Pain (4 - 6)  ondansetron Injectable 4 milliGRAM(s) IV Push every 6 hours PRN Nausea and/or Vomiting  oxyCODONE    IR 5 milliGRAM(s) Oral every 4 hours PRN Severe Pain (7 - 10)

## 2022-06-07 NOTE — DIETITIAN INITIAL EVALUATION ADULT - OTHER INFO
87M pmh HLD, falls (s/p cards w/u negative), pericardial effusion 2 yr ago, no significant pshx presents w epigastric/substernal pain a/w fever, nausea & decreased appetite. Febrile (100.5) and tachy (100s) upon ED presentation. Labs significant for TBil 1.7 (direct 0.6). US and CT (6/1) suspicious for acute cholecystitis s/p EUS no stones, normal CBD. Now s/p robotic assited cholecystectomy with RIVER placement (6/5).    Pt seen resting in bed, wife at bedside. No reported changes in wt, or appetite PTA. Currently on soft and bite sized diet, low fat. Reports was previously on clear liquids, today is first day with solid foods. Ate >50% of breakfast this AM. Continue to encourage adequate PO intake as able. Provided diet edu for pt, discussed sources of fat, how to limit, reviewed low fat/nonfat options, handouts provided for wife and pt. Pt receptive to education. No reported n/v/d. pt is fecal incontinent. Last BM 6/3. No reported pain at time of assessment. Skin: Scott 17, surgical incision noted. RD to follow per protocol. Please see below for nutr recs.

## 2022-06-07 NOTE — DIETITIAN INITIAL EVALUATION ADULT - OTHER CALCULATIONS
lbs. ABW used to calculate energy needs due to pt's current body weight within % IBW (106%). Needs adjusted for age and wt, adjusted for age and wt, post op demands

## 2022-06-07 NOTE — DIETITIAN INITIAL EVALUATION ADULT - ADD RECOMMEND
1. Continue with soft and bite sized diet, low fat diet  2. Monitor %PO intake  3. BM and pain regimen per team  4. Monitor BMP, BG, POCT, renal indices, LFTs, lytes, replete prn  5. Diet edu prn

## 2022-06-07 NOTE — PROGRESS NOTE ADULT - ASSESSMENT
87M pmh hld, falls (s/p cards w/u negative), pericardial effusion 2 yr ago, no significant pshx presents w epigastric/substernal pain a/w fever, nausea & decreased appetite. Febrile (100.5) and tachy (100s) upon ED presentation. Labs significant for TBil 1.7 (direct 0.6). US and CT (6/1) suspicious for acute cholecystitis s/p EUS no stones, normal CBD. Now s/p robotic assited cholecystectomy with RIVER placement (6/5)    CLD  Pain & nausea control prn  IVABx (Zosyn)  SCD/SQH  OOBA/IS  AM labs  RIVER  Dispo: [facility]

## 2022-06-07 NOTE — DIETITIAN INITIAL EVALUATION ADULT - PERTINENT LABORATORY DATA
06-07    138  |  105  |  8   ----------------------------<  92  3.6   |  24  |  1.03    Ca    8.6      07 Jun 2022 07:05  Phos  1.7     06-07  Mg     1.9     06-07    TPro  6.5  /  Alb  3.2<L>  /  TBili  1.4<H>  /  DBili  x   /  AST  50<H>  /  ALT  108<H>  /  AlkPhos  116  06-07

## 2022-06-07 NOTE — PROGRESS NOTE ADULT - SUBJECTIVE AND OBJECTIVE BOX
INTERVAL HPI/OVERNIGHT EVENTS:   SURGERY ATTENDING    STATUS POST:   Robotic cholecystectomy, LEA, Evacuation of right upper quadrant abscess, washout , drainage.      POST OPERATIVE DAY #: 2    SUBJECTIVE:  Flatus: [zx ] YES [ ] NO             Bowel Movement: [x ] YES [ ] NO  Pain (0-10):      3      Pain Control Adequate: [x ] YES [ ] NO  Nausea: [ ] YES [x ] NO            Vomiting: [ ] YES [x ] NO  Diarrhea: [ ] YES [x ] NO         Constipation: [ ] YES [x ] NO     Chest Pain: [ ] YES [x ] NO    SOB:  [ ] YES [x ] NO    MEDICATIONS  (STANDING):  heparin   Injectable 5000 Unit(s) SubCutaneous every 8 hours  influenza  Vaccine (HIGH DOSE) 0.7 milliLiter(s) IntraMuscular once  piperacillin/tazobactam IVPB.. 3.375 Gram(s) IV Intermittent every 6 hours  simvastatin 20 milliGRAM(s) Oral at bedtime  tamsulosin 0.4 milliGRAM(s) Oral at bedtime    MEDICATIONS  (PRN):  acetaminophen     Tablet .. 650 milliGRAM(s) Oral every 6 hours PRN Mild Pain (1 - 3), Moderate Pain (4 - 6)  ondansetron Injectable 4 milliGRAM(s) IV Push every 6 hours PRN Nausea and/or Vomiting  oxyCODONE    IR 5 milliGRAM(s) Oral every 4 hours PRN Severe Pain (7 - 10)      Vital Signs Last 24 Hrs  T(C): 36.8 (07 Jun 2022 20:15), Max: 36.8 (07 Jun 2022 20:15)  T(F): 98.2 (07 Jun 2022 20:15), Max: 98.2 (07 Jun 2022 20:15)  HR: 68 (07 Jun 2022 20:15) (57 - 83)  BP: 175/78 (07 Jun 2022 20:15) (154/74 - 183/73)  BP(mean): 110 (07 Jun 2022 17:04) (105 - 110)  RR: 18 (07 Jun 2022 20:15) (17 - 20)  SpO2: 95% (07 Jun 2022 20:15) (93% - 98%)    PHYSICAL EXAM:      Constitutional:    Eyes:    ENMT:    Neck:    Breasts:    Back:    Respiratory:    Cardiovascular:    Gastrointestinal:    Genitourinary:    Rectal:    Extremities:    Vascular:    Neurological:    Skin:    Lymph Nodes:    Musculoskeletal:    Psychiatric:        I&O's Detail    06 Jun 2022 07:01  -  07 Jun 2022 07:00  --------------------------------------------------------  IN:    IV PiggyBack: 300 mL  Total IN: 300 mL    OUT:    Bulb (mL): 80 mL    Incontinent per Condom Catheter (mL): 502 mL    Voided (mL): 1075 mL  Total OUT: 1657 mL    Total NET: -1357 mL      07 Jun 2022 07:01  -  07 Jun 2022 20:34  --------------------------------------------------------  IN:    IV PiggyBack: 250 mL    IV PiggyBack: 100 mL  Total IN: 350 mL    OUT:    Bulb (mL): 30 mL    Incontinent per Condom Catheter (mL): 550 mL    Voided (mL): 200 mL  Total OUT: 780 mL    Total NET: -430 mL          LABS:                        15.0   9.56  )-----------( 146      ( 07 Jun 2022 07:05 )             43.1     06-07    138  |  105  |  8   ----------------------------<  92  3.6   |  24  |  1.03    Ca    8.6      07 Jun 2022 07:05  Phos  1.7     06-07  Mg     1.9     06-07    TPro  6.5  /  Alb  3.2<L>  /  TBili  1.4<H>  /  DBili  0.3  /  AST  50<H>  /  ALT  108<H>  /  AlkPhos  116  06-07          RADIOLOGY & ADDITIONAL STUDIES:

## 2022-06-08 VITALS
HEART RATE: 79 BPM | RESPIRATION RATE: 19 BRPM | DIASTOLIC BLOOD PRESSURE: 81 MMHG | TEMPERATURE: 98 F | OXYGEN SATURATION: 96 % | SYSTOLIC BLOOD PRESSURE: 147 MMHG

## 2022-06-08 LAB
ALBUMIN SERPL ELPH-MCNC: 3 G/DL — LOW (ref 3.3–5)
ALP SERPL-CCNC: 116 U/L — SIGNIFICANT CHANGE UP (ref 40–120)
ALT FLD-CCNC: 89 U/L — HIGH (ref 10–45)
ANION GAP SERPL CALC-SCNC: 12 MMOL/L — SIGNIFICANT CHANGE UP (ref 5–17)
AST SERPL-CCNC: 38 U/L — SIGNIFICANT CHANGE UP (ref 10–40)
BILIRUB SERPL-MCNC: 1.3 MG/DL — HIGH (ref 0.2–1.2)
BUN SERPL-MCNC: 11 MG/DL — SIGNIFICANT CHANGE UP (ref 7–23)
CALCIUM SERPL-MCNC: 8.7 MG/DL — SIGNIFICANT CHANGE UP (ref 8.4–10.5)
CHLORIDE SERPL-SCNC: 105 MMOL/L — SIGNIFICANT CHANGE UP (ref 96–108)
CO2 SERPL-SCNC: 20 MMOL/L — LOW (ref 22–31)
CREAT SERPL-MCNC: 1.03 MG/DL — SIGNIFICANT CHANGE UP (ref 0.5–1.3)
CULTURE RESULTS: SIGNIFICANT CHANGE UP
CULTURE RESULTS: SIGNIFICANT CHANGE UP
EGFR: 71 ML/MIN/1.73M2 — SIGNIFICANT CHANGE UP
GLUCOSE SERPL-MCNC: 100 MG/DL — HIGH (ref 70–99)
HCT VFR BLD CALC: 43.7 % — SIGNIFICANT CHANGE UP (ref 39–50)
HGB BLD-MCNC: 15.1 G/DL — SIGNIFICANT CHANGE UP (ref 13–17)
MAGNESIUM SERPL-MCNC: 1.9 MG/DL — SIGNIFICANT CHANGE UP (ref 1.6–2.6)
MCHC RBC-ENTMCNC: 33 PG — SIGNIFICANT CHANGE UP (ref 27–34)
MCHC RBC-ENTMCNC: 34.6 GM/DL — SIGNIFICANT CHANGE UP (ref 32–36)
MCV RBC AUTO: 95.6 FL — SIGNIFICANT CHANGE UP (ref 80–100)
NRBC # BLD: 0 /100 WBCS — SIGNIFICANT CHANGE UP (ref 0–0)
PHOSPHATE SERPL-MCNC: 3.1 MG/DL — SIGNIFICANT CHANGE UP (ref 2.5–4.5)
PLATELET # BLD AUTO: 187 K/UL — SIGNIFICANT CHANGE UP (ref 150–400)
POTASSIUM SERPL-MCNC: 3.5 MMOL/L — SIGNIFICANT CHANGE UP (ref 3.5–5.3)
POTASSIUM SERPL-SCNC: 3.5 MMOL/L — SIGNIFICANT CHANGE UP (ref 3.5–5.3)
PROT SERPL-MCNC: 6.3 G/DL — SIGNIFICANT CHANGE UP (ref 6–8.3)
RBC # BLD: 4.57 M/UL — SIGNIFICANT CHANGE UP (ref 4.2–5.8)
RBC # FLD: 13.4 % — SIGNIFICANT CHANGE UP (ref 10.3–14.5)
SODIUM SERPL-SCNC: 137 MMOL/L — SIGNIFICANT CHANGE UP (ref 135–145)
SPECIMEN SOURCE: SIGNIFICANT CHANGE UP
SPECIMEN SOURCE: SIGNIFICANT CHANGE UP
WBC # BLD: 9.72 K/UL — SIGNIFICANT CHANGE UP (ref 3.8–10.5)
WBC # FLD AUTO: 9.72 K/UL — SIGNIFICANT CHANGE UP (ref 3.8–10.5)

## 2022-06-08 PROCEDURE — 86850 RBC ANTIBODY SCREEN: CPT

## 2022-06-08 PROCEDURE — 80053 COMPREHEN METABOLIC PANEL: CPT

## 2022-06-08 PROCEDURE — 36415 COLL VENOUS BLD VENIPUNCTURE: CPT

## 2022-06-08 PROCEDURE — 71045 X-RAY EXAM CHEST 1 VIEW: CPT

## 2022-06-08 PROCEDURE — 96374 THER/PROPH/DIAG INJ IV PUSH: CPT

## 2022-06-08 PROCEDURE — 86709 HEPATITIS A IGM ANTIBODY: CPT

## 2022-06-08 PROCEDURE — S2900: CPT

## 2022-06-08 PROCEDURE — 86900 BLOOD TYPING SEROLOGIC ABO: CPT

## 2022-06-08 PROCEDURE — 87635 SARS-COV-2 COVID-19 AMP PRB: CPT

## 2022-06-08 PROCEDURE — 76705 ECHO EXAM OF ABDOMEN: CPT

## 2022-06-08 PROCEDURE — 87340 HEPATITIS B SURFACE AG IA: CPT

## 2022-06-08 PROCEDURE — 83735 ASSAY OF MAGNESIUM: CPT

## 2022-06-08 PROCEDURE — 93306 TTE W/DOPPLER COMPLETE: CPT

## 2022-06-08 PROCEDURE — 74177 CT ABD & PELVIS W/CONTRAST: CPT | Mod: MA

## 2022-06-08 PROCEDURE — 97530 THERAPEUTIC ACTIVITIES: CPT

## 2022-06-08 PROCEDURE — 86803 HEPATITIS C AB TEST: CPT

## 2022-06-08 PROCEDURE — C1889: CPT

## 2022-06-08 PROCEDURE — 88304 TISSUE EXAM BY PATHOLOGIST: CPT

## 2022-06-08 PROCEDURE — 81001 URINALYSIS AUTO W/SCOPE: CPT

## 2022-06-08 PROCEDURE — 85027 COMPLETE CBC AUTOMATED: CPT

## 2022-06-08 PROCEDURE — 87040 BLOOD CULTURE FOR BACTERIA: CPT

## 2022-06-08 PROCEDURE — 82247 BILIRUBIN TOTAL: CPT

## 2022-06-08 PROCEDURE — 82553 CREATINE MB FRACTION: CPT

## 2022-06-08 PROCEDURE — 83605 ASSAY OF LACTIC ACID: CPT

## 2022-06-08 PROCEDURE — 86901 BLOOD TYPING SEROLOGIC RH(D): CPT

## 2022-06-08 PROCEDURE — C9399: CPT

## 2022-06-08 PROCEDURE — 86706 HEP B SURFACE ANTIBODY: CPT

## 2022-06-08 PROCEDURE — 84100 ASSAY OF PHOSPHORUS: CPT

## 2022-06-08 PROCEDURE — 97535 SELF CARE MNGMENT TRAINING: CPT

## 2022-06-08 PROCEDURE — 81003 URINALYSIS AUTO W/O SCOPE: CPT

## 2022-06-08 PROCEDURE — 83690 ASSAY OF LIPASE: CPT

## 2022-06-08 PROCEDURE — 97116 GAIT TRAINING THERAPY: CPT

## 2022-06-08 PROCEDURE — 99285 EMERGENCY DEPT VISIT HI MDM: CPT

## 2022-06-08 PROCEDURE — 82550 ASSAY OF CK (CPK): CPT

## 2022-06-08 PROCEDURE — 86705 HEP B CORE ANTIBODY IGM: CPT

## 2022-06-08 PROCEDURE — 85730 THROMBOPLASTIN TIME PARTIAL: CPT

## 2022-06-08 PROCEDURE — 86704 HEP B CORE ANTIBODY TOTAL: CPT

## 2022-06-08 PROCEDURE — 85025 COMPLETE CBC W/AUTO DIFF WBC: CPT

## 2022-06-08 PROCEDURE — 74181 MRI ABDOMEN W/O CONTRAST: CPT

## 2022-06-08 PROCEDURE — 82248 BILIRUBIN DIRECT: CPT

## 2022-06-08 PROCEDURE — 84484 ASSAY OF TROPONIN QUANT: CPT

## 2022-06-08 PROCEDURE — 97161 PT EVAL LOW COMPLEX 20 MIN: CPT

## 2022-06-08 PROCEDURE — 85610 PROTHROMBIN TIME: CPT

## 2022-06-08 PROCEDURE — 97162 PT EVAL MOD COMPLEX 30 MIN: CPT

## 2022-06-08 PROCEDURE — 93005 ELECTROCARDIOGRAM TRACING: CPT

## 2022-06-08 RX ORDER — POTASSIUM CHLORIDE 20 MEQ
40 PACKET (EA) ORAL ONCE
Refills: 0 | Status: COMPLETED | OUTPATIENT
Start: 2022-06-08 | End: 2022-06-08

## 2022-06-08 RX ORDER — LABETALOL HCL 100 MG
10 TABLET ORAL ONCE
Refills: 0 | Status: COMPLETED | OUTPATIENT
Start: 2022-06-08 | End: 2022-06-08

## 2022-06-08 RX ORDER — DIPHENHYDRAMINE HCL 50 MG
25 CAPSULE ORAL ONCE
Refills: 0 | Status: COMPLETED | OUTPATIENT
Start: 2022-06-08 | End: 2022-06-08

## 2022-06-08 RX ORDER — BENZOCAINE AND MENTHOL 5; 1 G/100ML; G/100ML
1 LIQUID ORAL ONCE
Refills: 0 | Status: COMPLETED | OUTPATIENT
Start: 2022-06-08 | End: 2022-06-08

## 2022-06-08 RX ORDER — OXYCODONE HYDROCHLORIDE 5 MG/1
1 TABLET ORAL
Qty: 0 | Refills: 0 | DISCHARGE
Start: 2022-06-08

## 2022-06-08 RX ORDER — ACETAMINOPHEN 500 MG
2 TABLET ORAL
Qty: 0 | Refills: 0 | DISCHARGE
Start: 2022-06-08

## 2022-06-08 RX ADMIN — Medication 40 MILLIEQUIVALENT(S): at 09:53

## 2022-06-08 RX ADMIN — HEPARIN SODIUM 5000 UNIT(S): 5000 INJECTION INTRAVENOUS; SUBCUTANEOUS at 05:38

## 2022-06-08 RX ADMIN — HEPARIN SODIUM 5000 UNIT(S): 5000 INJECTION INTRAVENOUS; SUBCUTANEOUS at 15:32

## 2022-06-08 RX ADMIN — PIPERACILLIN AND TAZOBACTAM 200 GRAM(S): 4; .5 INJECTION, POWDER, LYOPHILIZED, FOR SOLUTION INTRAVENOUS at 00:16

## 2022-06-08 RX ADMIN — BENZOCAINE AND MENTHOL 1 LOZENGE: 5; 1 LIQUID ORAL at 19:08

## 2022-06-08 RX ADMIN — PIPERACILLIN AND TAZOBACTAM 200 GRAM(S): 4; .5 INJECTION, POWDER, LYOPHILIZED, FOR SOLUTION INTRAVENOUS at 12:08

## 2022-06-08 RX ADMIN — PIPERACILLIN AND TAZOBACTAM 200 GRAM(S): 4; .5 INJECTION, POWDER, LYOPHILIZED, FOR SOLUTION INTRAVENOUS at 05:38

## 2022-06-08 RX ADMIN — Medication 25 MILLIGRAM(S): at 09:25

## 2022-06-08 RX ADMIN — Medication 10 MILLIGRAM(S): at 06:00

## 2022-06-08 RX ADMIN — PIPERACILLIN AND TAZOBACTAM 200 GRAM(S): 4; .5 INJECTION, POWDER, LYOPHILIZED, FOR SOLUTION INTRAVENOUS at 18:43

## 2022-06-08 RX ADMIN — Medication 10 MILLIGRAM(S): at 16:04

## 2022-06-08 NOTE — PROGRESS NOTE ADULT - ATTENDING COMMENTS
Abdomen distended, soft, appropriately tender, BS+, Flatus+, BM+, tolerating diet. RIVER serous, DVT prophylaxis, Spirometry, IV ABX, F/U LABS, VS, OOB to ambulate. REHAB placement.
Abdomen distended, soft, appropriately tender, BS+, Flatus+, BM+, tolerating diet. RIVER serous, DVT prophylaxis, Spirometry, IV ABX, F/U LABS, VS, OOB to ambulate. REHAB placement. D/C RIVER, transfer to REHAB
Abdomen distended, soft, appropriately tender, BS-, Flatus-, BM-, tolerating clear liquid diet. RIVER serous, DVT prophylaxis, Spirometry, IV ABX, F/U LABS, VS, OOB to ambulate.
ERCP by GI today, possible cholecystectomy on Monday
PREOP for tomorrow for cholecystectomy.

## 2022-06-08 NOTE — PROGRESS NOTE ADULT - SUBJECTIVE AND OBJECTIVE BOX
INTERVAL HPI/OVERNIGHT EVENTS: Mayur diet, [id number] (labetalol 10mg x1)    STATUS POST:    6/3: EUS: no stones, normal CBD  6/5: s/p RA Lap Rosalind + 19F drain    SUBJECTIVE: Pt seen and examined at bedside this am by surgery team. No acute complaints. Tolerating diet, pain well controlled. +F/-BM. Denies f/n/v/cp/sob.    MEDICATIONS  (STANDING):  heparin   Injectable 5000 Unit(s) SubCutaneous every 8 hours  influenza  Vaccine (HIGH DOSE) 0.7 milliLiter(s) IntraMuscular once  piperacillin/tazobactam IVPB.. 3.375 Gram(s) IV Intermittent every 6 hours  simvastatin 20 milliGRAM(s) Oral at bedtime  tamsulosin 0.4 milliGRAM(s) Oral at bedtime    MEDICATIONS  (PRN):  acetaminophen     Tablet .. 650 milliGRAM(s) Oral every 6 hours PRN Mild Pain (1 - 3), Moderate Pain (4 - 6)  ondansetron Injectable 4 milliGRAM(s) IV Push every 6 hours PRN Nausea and/or Vomiting  oxyCODONE    IR 5 milliGRAM(s) Oral every 4 hours PRN Severe Pain (7 - 10)    Vital Signs Last 24 Hrs  T(C): 36.7 (08 Jun 2022 05:40), Max: 36.8 (07 Jun 2022 20:15)  T(F): 98.1 (08 Jun 2022 05:40), Max: 98.2 (07 Jun 2022 20:15)  HR: 77 (08 Jun 2022 05:40) (68 - 82)  BP: 159/79 (08 Jun 2022 06:35) (154/74 - 183/73)  BP(mean): 110 (07 Jun 2022 17:04) (105 - 110)  RR: 17 (08 Jun 2022 05:40) (17 - 18)  SpO2: 95% (08 Jun 2022 05:40) (94% - 96%)    PHYSICAL EXAM:    Constitutional: A&Ox3, NAD    Respiratory: non labored breathing, no respiratory distress    Cardiovascular: NSR, RRR    Gastrointestinal: abdomen soft, nd, appropriately ttp to surgical site. Incisions c/d/i. RIVER x 1 w/ SS OP    Extremities: wwp, no calf tenderness or edema. SCDs in place     I&O's Detail    06 Jun 2022 07:01  -  07 Jun 2022 07:00  --------------------------------------------------------  IN:    IV PiggyBack: 300 mL  Total IN: 300 mL    OUT:    Bulb (mL): 80 mL    Incontinent per Condom Catheter (mL): 502 mL    Voided (mL): 1075 mL  Total OUT: 1657 mL    Total NET: -1357 mL      07 Jun 2022 07:01  -  08 Jun 2022 06:59  --------------------------------------------------------  IN:    IV PiggyBack: 250 mL    IV PiggyBack: 300 mL  Total IN: 550 mL    OUT:    Bulb (mL): 60 mL    Incontinent per Condom Catheter (mL): 1350 mL    Voided (mL): 200 mL  Total OUT: 1610 mL    Total NET: -1060 mL          LABS:                        15.0   9.56  )-----------( 146      ( 07 Jun 2022 07:05 )             43.1     06-07    138  |  105  |  8   ----------------------------<  92  3.6   |  24  |  1.03    Ca    8.6      07 Jun 2022 07:05  Phos  1.7     06-07  Mg     1.9     06-07    TPro  6.5  /  Alb  3.2<L>  /  TBili  1.4<H>  /  DBili  0.3  /  AST  50<H>  /  ALT  108<H>  /  AlkPhos  116  06-07          RADIOLOGY & ADDITIONAL STUDIES:

## 2022-06-08 NOTE — PROGRESS NOTE ADULT - SUBJECTIVE AND OBJECTIVE BOX
Interventional Cardiology Adult Progress Note    Subjective Assessment: no chest pain or dyspnea  	  MEDICATIONS:  tamsulosin 0.4 milliGRAM(s) Oral at bedtime    piperacillin/tazobactam IVPB.. 3.375 Gram(s) IV Intermittent every 6 hours      acetaminophen     Tablet .. 650 milliGRAM(s) Oral every 6 hours PRN  diphenhydrAMINE 25 milliGRAM(s) Oral once  ondansetron Injectable 4 milliGRAM(s) IV Push every 6 hours PRN  oxyCODONE    IR 5 milliGRAM(s) Oral every 4 hours PRN      simvastatin 20 milliGRAM(s) Oral at bedtime    heparin   Injectable 5000 Unit(s) SubCutaneous every 8 hours  influenza  Vaccine (HIGH DOSE) 0.7 milliLiter(s) IntraMuscular once      	    [PHYSICAL EXAM:  TELEMETRY:  T(C): 36.7 (06-08-22 @ 05:40), Max: 36.8 (06-07-22 @ 20:15)  HR: 77 (06-08-22 @ 05:40) (68 - 82)  BP: 159/79 (06-08-22 @ 06:35) (154/74 - 183/73)  RR: 17 (06-08-22 @ 05:40) (17 - 18)  SpO2: 95% (06-08-22 @ 05:40) (94% - 96%)  Wt(kg): --  I&O's Summary    07 Jun 2022 07:01  -  08 Jun 2022 07:00  --------------------------------------------------------  IN: 550 mL / OUT: 1610 mL / NET: -1060 mL        Estrella:  Central/PICC/Mid Line:                                         Appearance: Normal	  HEENT:   Normal oral mucosa, PERRL, EOMI	  Neck: Supple, + JVD/ - JVD; Carotid Bruit   Cardiovascular: Normal S1 S2, No JVD, No murmurs,   Respiratory: Lungs clear to auscultation/Decreased Breath Sounds/No Rales, Rhonchi, Wheezing	  Gastrointestinal:  Soft, Non-tender, + BS	  Skin: No rashes, No ecchymoses, No cyanosis  Extremities: Normal range of motion, No clubbing, cyanosis or edema  Vascular: Peripheral pulses palpable 2+ bilaterally  Neurologic: Non-focal  Psychiatry: A & O x 3, Mood & affect appropriate      	    ECG:  	NSR  RADIOLOGY:   DIAGNOSTIC TESTING:  [ ] Echocardiogram:  [ ]  Catheterization:  [ ] Stress Test:    [ ] JOSE JUAN  OTHER: 	    LABS:	 	  CARDIAC MARKERS:                                  15.1   9.72  )-----------( 187      ( 08 Jun 2022 05:30 )             43.7     06-07    138  |  105  |  8   ----------------------------<  92  3.6   |  24  |  1.03    Ca    8.6      07 Jun 2022 07:05  Phos  1.7     06-07  Mg     1.9     06-07    TPro  6.5  /  Alb  3.2<L>  /  TBili  1.4<H>  /  DBili  0.3  /  AST  50<H>  /  ALT  108<H>  /  AlkPhos  116  06-07    proBNP:   Lipid Profile:   HgA1c:   TSH:

## 2022-06-08 NOTE — PROGRESS NOTE ADULT - PROVIDER SPECIALTY LIST ADULT
Surgery
Gastroenterology
Surgery
Surgery
Cardiology
Surgery
Surgery
Cardiology
Surgery

## 2022-06-08 NOTE — DISCHARGE NOTE NURSING/CASE MANAGEMENT/SOCIAL WORK - PATIENT PORTAL LINK FT
You can access the FollowMyHealth Patient Portal offered by [facility] by registering at the following website: [email or web address]. By joining [employer]’s FollowMyHealth portal, you will also be able to view your health information using other applications (apps) compatible with our system.

## 2022-06-08 NOTE — PROGRESS NOTE ADULT - ASSESSMENT
88yo M with BPH, HLD presenting with acute cholecystitis with plan for laparascopic cholecystectomy.   cardiology consulted for a preoperative cardiac risk assessment.   ECG with NSR, RBBB, LAFB   stable post op course  needs metoprolol 25 q12 with holding parameters as in patient  Outpatient Toprol 50/d

## 2022-06-08 NOTE — PROGRESS NOTE ADULT - ASSESSMENT
87M pmh hld, falls (s/p cards w/u negative), pericardial effusion 2 yr ago, no significant pshx presents w epigastric/substernal pain a/w fever, nausea & decreased appetite. Febrile (100.5) and tachy (100s) upon ED presentation. Labs significant for TBil 1.7 (direct 0.6). US and CT (6/1) suspicious for acute cholecystitis s/p EUS no stones, normal CBD. Now s/p robotic assisted cholecystectomy with RIVER placement (6/5)    Soft low fat diet  Pain & nausea control prn  IVABx (Zosyn)  Statin, Flomax  SCD/SQH  OOBA/IS  AM labs  RIVER  Dispo: [facility]

## 2022-06-08 NOTE — DISCHARGE NOTE NURSING/CASE MANAGEMENT/SOCIAL WORK - NSDCFUADDAPPT_GEN_ALL_CORE_FT
Please follow up with Cardiology (Dr. Naidu) in 1 week from discharge. Take metoprolol succinate 50mg once daily until follow up appt.

## 2022-06-08 NOTE — PROGRESS NOTE ADULT - SUBJECTIVE AND OBJECTIVE BOX
INTERVAL HPI/OVERNIGHT EVENTS:   SURGERY ATTENDING    STATUS POST:      Robotic cholecystectomy, LEA, Evacuation of right upper quadrant abscess, washout , drainage.        POST OPERATIVE DAY #: 3    SUBJECTIVE:  Flatus: [x ] YES [ ] NO             Bowel Movement: [x ] YES [ ] NO  Pain (0-10):      3      Pain Control Adequate: [x ] YES [ ] NO  Nausea: [ ] YES [x ] NO            Vomiting: [ ] YES [x ] NO  Diarrhea: [ ] YES [x ] NO         Constipation: [ ] YES [x ] NO     Chest Pain: [ ] YES [x ] NO    SOB:  [ ] YES [x ] NO    MEDICATIONS  (STANDING):  heparin   Injectable 5000 Unit(s) SubCutaneous every 8 hours  influenza  Vaccine (HIGH DOSE) 0.7 milliLiter(s) IntraMuscular once  piperacillin/tazobactam IVPB.. 3.375 Gram(s) IV Intermittent every 6 hours  simvastatin 20 milliGRAM(s) Oral at bedtime  tamsulosin 0.4 milliGRAM(s) Oral at bedtime    MEDICATIONS  (PRN):  acetaminophen     Tablet .. 650 milliGRAM(s) Oral every 6 hours PRN Mild Pain (1 - 3), Moderate Pain (4 - 6)  ondansetron Injectable 4 milliGRAM(s) IV Push every 6 hours PRN Nausea and/or Vomiting  oxyCODONE    IR 5 milliGRAM(s) Oral every 4 hours PRN Severe Pain (7 - 10)      Vital Signs Last 24 Hrs  T(C): 37.1 (08 Jun 2022 08:57), Max: 37.1 (08 Jun 2022 08:57)  T(F): 98.7 (08 Jun 2022 08:57), Max: 98.7 (08 Jun 2022 08:57)  HR: 82 (08 Jun 2022 08:57) (66 - 82)  BP: 161/78 (08 Jun 2022 08:57) (158/75 - 183/73)  BP(mean): 110 (07 Jun 2022 17:04) (110 - 110)  RR: 18 (08 Jun 2022 08:57) (17 - 19)  SpO2: 95% (08 Jun 2022 08:57) (94% - 96%)    PHYSICAL EXAM:      Constitutional:    Eyes:    ENMT:    Neck:    Breasts:    Back:    Respiratory:    Cardiovascular:    Gastrointestinal:    Genitourinary:    Rectal:    Extremities:    Vascular:    Neurological:    Skin:    Lymph Nodes:    Musculoskeletal:    Psychiatric:        I&O's Detail    07 Jun 2022 07:01  -  08 Jun 2022 07:00  --------------------------------------------------------  IN:    IV PiggyBack: 300 mL    IV PiggyBack: 250 mL  Total IN: 550 mL    OUT:    Bulb (mL): 60 mL    Incontinent per Condom Catheter (mL): 1350 mL    Voided (mL): 200 mL  Total OUT: 1610 mL    Total NET: -1060 mL      08 Jun 2022 07:01  -  08 Jun 2022 12:48  --------------------------------------------------------  IN:  Total IN: 0 mL    OUT:    Incontinent per Condom Catheter (mL): 500 mL  Total OUT: 500 mL    Total NET: -500 mL          LABS:                        15.1   9.72  )-----------( 187      ( 08 Jun 2022 05:30 )             43.7     06-08    137  |  105  |  11  ----------------------------<  100<H>  3.5   |  20<L>  |  1.03    Ca    8.7      08 Jun 2022 05:30  Phos  3.1     06-08  Mg     1.9     06-08    TPro  6.3  /  Alb  3.0<L>  /  TBili  1.3<H>  /  DBili  x   /  AST  38  /  ALT  89<H>  /  AlkPhos  116  06-08          RADIOLOGY & ADDITIONAL STUDIES:

## 2022-06-08 NOTE — DISCHARGE NOTE NURSING/CASE MANAGEMENT/SOCIAL WORK - NSDCPEFALRISK_GEN_ALL_CORE
For information on Fall & Injury Prevention, visit: [email or web address] OR  [email or web address] OR  https://www.cdc.gov/steadi/patient.html

## 2022-06-14 DIAGNOSIS — R18.8 OTHER ASCITES: ICD-10-CM

## 2022-06-14 DIAGNOSIS — R10.13 EPIGASTRIC PAIN: ICD-10-CM

## 2022-06-14 DIAGNOSIS — Z86.19 PERSONAL HISTORY OF OTHER INFECTIOUS AND PARASITIC DISEASES: ICD-10-CM

## 2022-06-14 DIAGNOSIS — K66.0 PERITONEAL ADHESIONS (POSTPROCEDURAL) (POSTINFECTION): ICD-10-CM

## 2022-06-14 DIAGNOSIS — K65.1 PERITONEAL ABSCESS: ICD-10-CM

## 2022-06-14 DIAGNOSIS — E78.5 HYPERLIPIDEMIA, UNSPECIFIED: ICD-10-CM

## 2022-06-14 DIAGNOSIS — K80.00 CALCULUS OF GALLBLADDER WITH ACUTE CHOLECYSTITIS WITHOUT OBSTRUCTION: ICD-10-CM

## 2022-06-14 DIAGNOSIS — K40.90 UNILATERAL INGUINAL HERNIA, WITHOUT OBSTRUCTION OR GANGRENE, NOT SPECIFIED AS RECURRENT: ICD-10-CM

## 2022-06-14 DIAGNOSIS — Z91.81 HISTORY OF FALLING: ICD-10-CM

## 2022-06-14 DIAGNOSIS — Z87.891 PERSONAL HISTORY OF NICOTINE DEPENDENCE: ICD-10-CM

## 2022-06-16 LAB — SURGICAL PATHOLOGY STUDY: SIGNIFICANT CHANGE UP

## 2023-04-05 NOTE — ED ADULT TRIAGE NOTE - ESI TRIAGE ACUITY LEVEL, MLM
Phone: [phone]  Fax: [phone] [facility] Office Hours:  Monday: [place] office location 8-5 ([phone]) Offering additional late hours the first Monday of the month until 7 pm.   Tuesday: 8-5 Wednesday: 8-5 Thursday:  Additional hours offered 2 Thursdays a month. Please call to inquire those dates. Fridays: 7:30-4:30   SURVEY:    You may be receiving a survey from [employer] regarding your visit today. Please complete the survey to enable us to provide the highest quality of care to you and your family. If you cannot score us a very good on any question, please call the office to discuss how we could have made your experience a very good one. Thank you. 3

## 2023-11-15 NOTE — PROVIDER CONTACT NOTE (OTHER) - NAME OF MD/NP/PA/DO NOTIFIED:
Patients last appointment 8/22/2023.   Patients next scheduled appointment   Future Appointments   Date Time Provider [address]   11/21/2023  9:15 AM [facility]
Jurgen Pfeiffer MD

## (undated) DEVICE — TIP METZENBAUM SCISSOR MONOPOLAR ENDOCUT (ORANGE)

## (undated) DEVICE — WARMING BLANKET UPPER ADULT

## (undated) DEVICE — DRAPE C ARM 41X74"

## (undated) DEVICE — DRSG DERMABOND 0.7ML

## (undated) DEVICE — PACK GENERAL LAPAROSCOPY

## (undated) DEVICE — STOPCOCK 3 WAY

## (undated) DEVICE — SUT PDS II 0 18" ENDOLOOP LIGATURE

## (undated) DEVICE — SOL IRR BAG NS 0.9% 3000ML

## (undated) DEVICE — TUBING STRYKER PNEUMOSURE HI FLOW INSUFFLATOR

## (undated) DEVICE — TROCAR APPLIED MEDICAL KII BALLOON BLUNT TIP 12MM X 100MM

## (undated) DEVICE — SYR LUER LOK 10CC

## (undated) DEVICE — TROCAR COVIDIEN VERSAPORT BLADELESS OPTICAL 5MM STANDARD

## (undated) DEVICE — GLV 7.5 SENSICARE W ALOE

## (undated) DEVICE — TROCAR COVIDIEN VERSAONE FIXATION CANNULA 5MM

## (undated) DEVICE — VENODYNE/SCD SLEEVE CALF MEDIUM

## (undated) DEVICE — SUT MONOCRYL 4-0 18" PS-2

## (undated) DEVICE — D HELP - CLEARVIEW CLEARIFY SYSTEM

## (undated) DEVICE — SUT VICRYL 0 27" UR-6

## (undated) DEVICE — XI ENDOWRIST SUCTION IRRIGATOR 8MM

## (undated) DEVICE — Device

## (undated) DEVICE — CATH ANGIOCATH 12G

## (undated) DEVICE — POSITIONER FOAM EGG CRATE ULNAR 2PCS (PINK)

## (undated) DEVICE — ENDOCATCH 10MM SPECIMEN POUCH

## (undated) DEVICE — SUT MAXON 0 30" HGU-46

## (undated) DEVICE — DRSG STERISTRIPS 0.5 X 4"